# Patient Record
Sex: FEMALE | Race: WHITE | Employment: OTHER | URBAN - METROPOLITAN AREA
[De-identification: names, ages, dates, MRNs, and addresses within clinical notes are randomized per-mention and may not be internally consistent; named-entity substitution may affect disease eponyms.]

---

## 2021-03-24 ENCOUNTER — APPOINTMENT (OUTPATIENT)
Dept: GENERAL RADIOLOGY | Age: 71
End: 2021-03-24
Payer: MEDICARE

## 2021-03-24 ENCOUNTER — HOSPITAL ENCOUNTER (EMERGENCY)
Age: 71
Discharge: HOME | End: 2021-03-24
Attending: EMERGENCY MEDICINE
Payer: MEDICARE

## 2021-03-24 VITALS
BODY MASS INDEX: 18.16 KG/M2 | TEMPERATURE: 97.5 F | DIASTOLIC BLOOD PRESSURE: 66 MMHG | HEIGHT: 66 IN | SYSTOLIC BLOOD PRESSURE: 131 MMHG | WEIGHT: 113 LBS | OXYGEN SATURATION: 98 % | HEART RATE: 79 BPM | RESPIRATION RATE: 18 BRPM

## 2021-03-24 DIAGNOSIS — S52.531A CLOSED COLLES' FRACTURE OF RIGHT RADIUS, INITIAL ENCOUNTER: Primary | ICD-10-CM

## 2021-03-24 PROCEDURE — 73110 X-RAY EXAM OF WRIST: CPT

## 2021-03-24 PROCEDURE — 99285 EMERGENCY DEPT VISIT HI MDM: CPT

## 2021-03-24 PROCEDURE — 6370000000 HC RX 637 (ALT 250 FOR IP): Performed by: PHYSICIAN ASSISTANT

## 2021-03-24 PROCEDURE — 25605 CLTX DST RDL FX/EPHYS SEP W/: CPT

## 2021-03-24 PROCEDURE — 73130 X-RAY EXAM OF HAND: CPT

## 2021-03-24 RX ORDER — CALCIUM CARBONATE 300MG(750)
TABLET,CHEWABLE ORAL
COMMUNITY
End: 2021-03-26 | Stop reason: ALTCHOICE

## 2021-03-24 RX ORDER — CYANOCOBALAMIN (VITAMIN B-12) 1000 MCG
2 TABLET, EXTENDED RELEASE ORAL 2 TIMES DAILY WITH MEALS
COMMUNITY

## 2021-03-24 RX ORDER — OMEPRAZOLE 20 MG/1
20 CAPSULE, DELAYED RELEASE ORAL 2 TIMES DAILY
COMMUNITY

## 2021-03-24 RX ORDER — POTASSIUM CHLORIDE 750 MG/1
10 CAPSULE, EXTENDED RELEASE ORAL 2 TIMES DAILY
COMMUNITY

## 2021-03-24 RX ORDER — LEVOCETIRIZINE DIHYDROCHLORIDE 5 MG/1
5 TABLET, FILM COATED ORAL NIGHTLY
COMMUNITY

## 2021-03-24 RX ORDER — TRAMADOL HYDROCHLORIDE 50 MG/1
50-100 TABLET ORAL EVERY 6 HOURS PRN
Qty: 10 TABLET | Refills: 0 | Status: SHIPPED | OUTPATIENT
Start: 2021-03-24 | End: 2021-03-27

## 2021-03-24 RX ORDER — ACETAMINOPHEN 325 MG/1
650 TABLET ORAL ONCE
Status: COMPLETED | OUTPATIENT
Start: 2021-03-24 | End: 2021-03-24

## 2021-03-24 RX ORDER — FERROUS SULFATE 325(65) MG
325 TABLET ORAL 2 TIMES DAILY
COMMUNITY

## 2021-03-24 RX ORDER — TRAMADOL HYDROCHLORIDE 50 MG/1
50-100 TABLET ORAL EVERY 6 HOURS PRN
Qty: 10 TABLET | Refills: 0 | Status: SHIPPED | OUTPATIENT
Start: 2021-03-24 | End: 2021-03-24 | Stop reason: SDUPTHER

## 2021-03-24 RX ADMIN — ACETAMINOPHEN 650 MG: 325 TABLET ORAL at 17:07

## 2021-03-24 ASSESSMENT — PAIN DESCRIPTION - ORIENTATION: ORIENTATION: RIGHT

## 2021-03-24 NOTE — ED NOTES
Patient cleared for discharge. Patient discharge instructions explained, Rx given and explained to patient. Patient verbalized understanding of all instructions and all patient questions answered to their satisfaction. Patient departs in stable condition.         Arleen Carballo RN  03/24/21 [id number]

## 2021-03-24 NOTE — ED NOTES
JOSE Romp is at bedside reducing pt's right wrist and applying splint. Pt is tolerating well.       Nadine Navarro RN  03/24/21 [id number]

## 2021-03-24 NOTE — ED NOTES
Pt arrives with c/o right wrist injury that occurred PTA. Pt reports she was standing up from a chair, lost her balance, and fell backwards. Pt reports she landed on her buttock and braced herself with right arm. Pt denies hitting head or LOC. Pt has obvious deformity to right wrist and ROM is decreased d/t pain. Pulses and sensation are intact to right hand. Pt denies any other injuries.  Pt given ice pack for wrist.     Briseyda Duarte RN  03/24/21 1943

## 2021-03-24 NOTE — ED PROVIDER NOTES
[address] [facility] ED    [address] [facility] [address]  Phone: [phone]  Fax: [phone]  Emergency Department  Faculty Attestation    I performed a history and physical examination of the patient and discussed management with the mid level provideer. I reviewed the mid level provider's note and agree with the documented findings and plan of care. Any areas of disagreement are noted on the chart. I was personally present for the key portions of any procedures. I have documented in the chart those procedures where I was not present during the key portions. I have reviewed the emergency nurses triage note. I agree with the chief complaint, past medical history, past surgical history, allergies, medications, social and family history as documented unless otherwise noted below. Documentation of the HPI, Physical Exam and Medical Decision Making performed by medical students or scribes is based on my personal performance of the HPI, PE and MDM. For Physician Assistant/ Nurse Practitioner cases/documentation I have personally evaluated this patient and have completed at least one if not all key elements of the E/M (history, physical exam, and MDM). Additional findings are as noted. Primary Care Physician:  Kristin Gong       Chief Complaint   Patient presents with    Wrist Injury       RECENT VITALS:   Temp: 97.5 °F (36.4 °C),  Pulse: 79, Resp: 18, BP: 131/66    LABS:  Labs Reviewed - No data to display     XR WRIST RIGHT (MIN 3 VIEWS) (Final result)  Result time 03/24/21 18:03:31  Final result by Mikey Conner DO (03/24/21 18:03:31)                Impression:    Improved alignment of the distal radius fracture status post reduction and   splinting. Narrative:    EXAMINATION:   3 XRAY VIEWS OF THE RIGHT WRIST     3/24/2021 2:55 pm     COMPARISON:   None.      HISTORY:   ORDERING SYSTEM PROVIDED HISTORY: post-reduction   TECHNOLOGIST PROVIDED HISTORY: OF THE RIGHT WRIST; THREE XRAY VIEWS OF THE RIGHT HAND     3/24/2021 1:57 pm     COMPARISON:   None. HISTORY:   ORDERING SYSTEM PROVIDED HISTORY: pain, [address]   TECHNOLOGIST PROVIDED HISTORY:   pain, [address]   Reason for Exam: Pt states fall, [address] RT wrist pain and deformity   Acuity: Acute   Type of Exam: Initial; ORDERING SYSTEM PROVIDED HISTORY: fall, [address]   TECHNOLOGIST PROVIDED HISTORY:   fall, [address]   Reason for Exam: Pt states fall, [address] RT wrist pain and deformity   Acuity: Acute   Type of Exam: Initial     FINDINGS:   Comminuted and impacted distal radius fracture with approximately 45 degrees   distal dorsal angulation.  Nondisplaced ulnar styloid fracture.  Moderate   soft tissue swelling about the wrist.  Degenerative changes at the   interphalangeal joints and radial sided wrist on a background of osseous   demineralization.                       PERTINENT ATTENDING PHYSICIAN COMMENTS:    The patient presents for a fall onto her buttocks and  outstretched right wrist.  The fall occurred prior to arrival.  She did not strike her head or lose consciousness. She was not dizzy prior to or following the fall. On exam, the patient has obvious deformity to the wrist with an intact radial pulse and no open wounds. She has intact sensorimotor functioning of the fingers and no pain in the elbow or shoulder. The Xray confirms fracture. The patient was splinted by the PA. The patient had good color, sensation, motion of the fingertips after placement. We will refer her to the orthopedist and provide medication for pain. The patient is discharged in good condition.          Nita Reich MD  03/24/21 6693

## 2021-03-24 NOTE — ED PROVIDER NOTES
[address] [facility] ED  [address] [facility] [address]  Phone: [phone]  Fax: [phone]      eMERGENCY dEPARTMENT eNCOUnter      Pt Name: Arnie Zayas  MRN: [id number]  [place] 1950  Date of evaluation: 3/24/21      CHIEF COMPLAINT:  Chief Complaint   Patient presents with    Wrist Injury       [address] [facility] Geena Perry is a 66 y.o. female who presents with evaluation for orthopedic pain:    Location/Symptom:   Right wrist pain/deformity  Timing/Onset:   JPTA  Context/Setting:    Pt was standing up from seated position and fell backwards. She landed on buttocks and caught herself with her right hand. Pain/swelling/deformity of wrist since that time. Did not strike head. No HA/neck-back/chest-abd-other extremity pain or injury reported. No paresthesias/focal weakness. Quality:   Achy, sharp  Duration:   constant  Modifying Factors: Worse with movement and weightbearing, better with rest  Severity:   Mild-moderate    Nursing Notes were reviewed. REVIEW OF SYSTEMS       Constitutional: Denies recent fever, chills. Eyes: No vision changes. Neck: No neck pain. Respiratory: Denies recent shortness of breath. Cardiac:  Denies recent chest pain. GI:  Denies abdominal pain/nausea/vomiting/diarrhea. : Denies dysuria. Musculoskeletal:   Per HPI  Neurologic:  No headache. No focal weakness. No paresthesias. Skin:  Denies any rash. Negative in 10 essential Systems except as mentioned above and in the HPI. PAST MEDICAL HISTORY   PMH:  has a past medical history of Bronchiectasis ([facility] [address] Diabetes mellitus ([facility] [address] and Osteoporosis. Surgical History:  has no past surgical history on file. Social History:  reports that she has never smoked. She has never used smokeless tobacco. She reports current alcohol use. She reports that she does not use drugs.   Family History: None  Psychiatric History: None    Allergies:is allergic to penicillins and other.      PHYSICAL EXAM     INITIAL VITALS: /66   Pulse 79   Temp 97.5 °F (36.4 °C) (Oral)   Resp 18   Ht 5' 6\" (1.676 m)   Wt 51.3 kg (113 lb)   SpO2 98%   BMI 18.24 kg/m²     Constitutional:  Well developed   Eyes:  Pupils equal/round  HENT:  Atraumatic, external ears normal, nose normal  Respiratory:  LCTA bilat, no W/R/R  Cardiovascular:  RRR with normal S1 and S2  Musculoskeletal:  Dorsal deformity of right wrist with overlying edema, no bony tenting. Limited ROM due to pain. Moving fingers actively, NV intact distally x 5. No other cervical spine/GH-elbow TTP or pain with ROM. No pain with pelvic compression. No TTP of BLE, normal ROM. NV intact distally. Back:  No midline or CVA tenderness. Integument:   No rash. Neurologic:  Alert & appropriate mentation/interaction, no focal deficits noted     DIAGNOSTIC RESULTS     EKG: All EKG's are interpreted by the Emergency Department Physician who either signs or Co-signs this chart in the absence of a cardiologist.  Not indicated    RADIOLOGY:   Reviewed the radiologist:  XR WRIST RIGHT (MIN 3 VIEWS)   Final Result   Improved alignment of the distal radius fracture status post reduction and   splinting. XR HAND RIGHT (MIN 3 VIEWS)   Final Result   Comminuted and impacted distal radius fracture with nearly 45 degrees of   distal dorsal angulation. Nondisplaced ulnar styloid fracture. XR WRIST RIGHT (MIN 3 VIEWS)   Final Result   Comminuted and impacted distal radius fracture with nearly 45 degrees of   distal dorsal angulation. Nondisplaced ulnar styloid fracture. LABS:  Labs Reviewed - No data to display  Not indicated    EMERGENCY DEPARTMENT COURSE / MDM:     [address] injury, no head injury, no blood thinners. Landed on buttocks and then right wrist/hand. Tylenol and XRs ordered.   No other trauma by history or exam.       Joint Reduction Procedure Note    Indication: fracture    Consent: The patient provided verbal consent for this procedure. Procedure: The pre-reduction exam showed distal perfusion & neurologic function to be normal. The patient was placed in the sitting position. Anesthesia/pain control was obtained using a hematoma block of the affected area using 2.0 cc of 1% Lidocaine without epinephrine. Reduction of the right wrist was performed by traction and counter traction. Post reduction films were obtained and revealed satisfactory reduction. A post-reduction exam revealed distal perfusion & neurologic function to be normal. The affected area was immobilized with a thumb spica splint/wrist splint. The patient tolerated the procedure well. Complications: None    Splinting Procedure:    4\" Orthoglass used for  THUMBSPICA  splint, good immobilization of injured joint. My splint re-evaluation demonstrates that pt is NV intact distally after curing and is having no reported discomfort. Pt referred to Orthopedics for follow up/fracture management. No [facility] appt available until Monday which doesn't work for her scheduled. Giving [facility] referral, having her call their office tomorrow morning for scheduling. Sling applied. Improved alignment with reduction. [id number]  I have reviewed the disposition diagnosis with the patient and or their family/guardian. I have answered their questions and given discharge instructions. They voiced understanding of these instructions and did not have any further questions or complaints. Discussed checking cap refill for good blood flow. Controlled Substance Monitoring:    Acute and Chronic Pain Monitoring:   RX Monitoring 3/24/2021   Periodic Controlled Substance Monitoring No signs of potential drug abuse or diversion identified.          Orders Placed This Encounter   Medications    acetaminophen (TYLENOL) tablet 650 mg    traMADol (ULTRAM) 50 MG tablet     Sig: Take 1-2 tablets by mouth every 6 hours as needed for Pain for up to 3 days. Dispense:  10 tablet     Refill:  0       CONSULTS:  None      FINAL IMPRESSION      1. Closed Colles' fracture of right radius, initial encounter          DISPOSITION/PLAN:  DISPOSITION          PATIENT REFERRED TO:  Eneida Parra DO  [address] [place] [address]  [phone]    Schedule an appointment as soon as possible for a visit   For fracture management    [facility] ED  [address] [place] [id number] [phone]  Go to   increasing pain, numbness or significant swelling of hand/fingers      DISCHARGE MEDICATIONS:  New Prescriptions    TRAMADOL (ULTRAM) 50 MG TABLET    Take 1-2 tablets by mouth every 6 hours as needed for Pain for up to 3 days.        (Please note that portions of this note were completed with a voice recognition program.  Efforts were made to edit the dictations but occasionally words are mis-transcribed.)    JOSE Ken PA-C  03/24/21 [id number]

## 2021-03-26 ENCOUNTER — HOSPITAL ENCOUNTER (OUTPATIENT)
Age: 71
Discharge: HOME | End: 2021-03-26
Payer: MEDICARE

## 2021-03-26 ENCOUNTER — HOSPITAL ENCOUNTER (OUTPATIENT)
Dept: LAB | Age: 71
Discharge: HOME | End: 2021-03-26
Payer: MEDICARE

## 2021-03-26 ENCOUNTER — HOSPITAL ENCOUNTER (OUTPATIENT)
Dept: GENERAL RADIOLOGY | Age: 71
Discharge: HOME | End: 2021-03-28
Payer: MEDICARE

## 2021-03-26 ENCOUNTER — HOSPITAL ENCOUNTER (OUTPATIENT)
Age: 71
Discharge: HOME | End: 2021-03-28
Payer: MEDICARE

## 2021-03-26 ENCOUNTER — OFFICE VISIT (OUTPATIENT)
Dept: ORTHOPEDIC SURGERY | Age: 71
End: 2021-03-26
Payer: MEDICARE

## 2021-03-26 VITALS — HEIGHT: 66 IN | BODY MASS INDEX: 18.16 KG/M2 | WEIGHT: 113 LBS

## 2021-03-26 DIAGNOSIS — Z01.818 PRE-OP TESTING: Primary | ICD-10-CM

## 2021-03-26 DIAGNOSIS — Z01.818 PRE-OP TESTING: ICD-10-CM

## 2021-03-26 DIAGNOSIS — S52.501A CLOSED FRACTURE OF DISTAL END OF RIGHT RADIUS, UNSPECIFIED FRACTURE MORPHOLOGY, INITIAL ENCOUNTER: Primary | ICD-10-CM

## 2021-03-26 DIAGNOSIS — Z20.822 COVID-19 RULED OUT BY LABORATORY TESTING: ICD-10-CM

## 2021-03-26 DIAGNOSIS — Z20.822 COVID-19 RULED OUT BY LABORATORY TESTING: Primary | ICD-10-CM

## 2021-03-26 LAB
-: NORMAL
ALBUMIN SERPL-MCNC: 3.8 G/DL (ref 3.5–5.2)
ALBUMIN/GLOBULIN RATIO: 1 (ref 1–2.5)
ALP BLD-CCNC: 286 U/L (ref 35–104)
ALT SERPL-CCNC: 58 U/L (ref 5–33)
AMORPHOUS: NORMAL
ANION GAP SERPL CALCULATED.3IONS-SCNC: 11 MMOL/L (ref 9–17)
AST SERPL-CCNC: 34 U/L
BACTERIA: NORMAL
BILIRUB SERPL-MCNC: 0.31 MG/DL (ref 0.3–1.2)
BILIRUBIN URINE: ABNORMAL
BUN BLDV-MCNC: 11 MG/DL (ref 8–23)
BUN/CREAT BLD: ABNORMAL (ref 9–20)
CALCIUM SERPL-MCNC: 9 MG/DL (ref 8.6–10.4)
CASTS UA: NORMAL /LPF (ref 0–8)
CHLORIDE BLD-SCNC: 99 MMOL/L (ref 98–107)
CO2: 28 MMOL/L (ref 20–31)
COLOR: ABNORMAL
COMMENT UA: ABNORMAL
CREAT SERPL-MCNC: 0.46 MG/DL (ref 0.5–0.9)
CRYSTALS, UA: NORMAL /HPF
EPITHELIAL CELLS UA: NORMAL /HPF (ref 0–5)
GFR AFRICAN AMERICAN: >60 ML/MIN
GFR NON-AFRICAN AMERICAN: >60 ML/MIN
GFR SERPL CREATININE-BSD FRML MDRD: ABNORMAL ML/MIN/{1.73_M2}
GFR SERPL CREATININE-BSD FRML MDRD: ABNORMAL ML/MIN/{1.73_M2}
GLUCOSE BLD-MCNC: 100 MG/DL (ref 70–99)
GLUCOSE URINE: NEGATIVE
HCT VFR BLD CALC: 40.6 % (ref 36.3–47.1)
HEMOGLOBIN: 12.6 G/DL (ref 11.9–15.1)
KETONES, URINE: ABNORMAL
LEUKOCYTE ESTERASE, URINE: ABNORMAL
MCH RBC QN AUTO: 29.7 PG (ref 25.2–33.5)
MCHC RBC AUTO-ENTMCNC: 31 G/DL (ref 28.4–34.8)
MCV RBC AUTO: 95.8 FL (ref 82.6–102.9)
MUCUS: NORMAL
NITRITE, URINE: NEGATIVE
NRBC AUTOMATED: 0 PER 100 WBC
OTHER OBSERVATIONS UA: NORMAL
PDW BLD-RTO: 12.7 % (ref 11.8–14.4)
PH UA: 5 (ref 5–8)
PLATELET # BLD: 224 K/UL (ref 138–453)
PMV BLD AUTO: 12.2 FL (ref 8.1–13.5)
POTASSIUM SERPL-SCNC: 3.2 MMOL/L (ref 3.7–5.3)
PROTEIN UA: ABNORMAL
RBC # BLD: 4.24 M/UL (ref 3.95–5.11)
RBC UA: NORMAL /HPF (ref 0–4)
RENAL EPITHELIAL, UA: NORMAL /HPF
SODIUM BLD-SCNC: 138 MMOL/L (ref 135–144)
SPECIFIC GRAVITY UA: 1.03 (ref 1–1.03)
TOTAL PROTEIN: 7.5 G/DL (ref 6.4–8.3)
TRICHOMONAS: NORMAL
TURBIDITY: CLEAR
URINE HGB: NEGATIVE
UROBILINOGEN, URINE: NORMAL
WBC # BLD: 11.8 K/UL (ref 3.5–11.3)
WBC UA: NORMAL /HPF (ref 0–5)
YEAST: NORMAL

## 2021-03-26 PROCEDURE — 85027 COMPLETE CBC AUTOMATED: CPT

## 2021-03-26 PROCEDURE — 4040F PNEUMOC VAC/ADMIN/RCVD: CPT | Performed by: ORTHOPAEDIC SURGERY

## 2021-03-26 PROCEDURE — G8400 PT W/DXA NO RESULTS DOC: HCPCS | Performed by: ORTHOPAEDIC SURGERY

## 2021-03-26 PROCEDURE — 1123F ACP DISCUSS/DSCN MKR DOCD: CPT | Performed by: ORTHOPAEDIC SURGERY

## 2021-03-26 PROCEDURE — 93005 ELECTROCARDIOGRAM TRACING: CPT | Performed by: ORTHOPAEDIC SURGERY

## 2021-03-26 PROCEDURE — 81001 URINALYSIS AUTO W/SCOPE: CPT

## 2021-03-26 PROCEDURE — 1090F PRES/ABSN URINE INCON ASSESS: CPT | Performed by: ORTHOPAEDIC SURGERY

## 2021-03-26 PROCEDURE — 3017F COLORECTAL CA SCREEN DOC REV: CPT | Performed by: ORTHOPAEDIC SURGERY

## 2021-03-26 PROCEDURE — U0005 INFEC AGEN DETEC AMPLI PROBE: HCPCS

## 2021-03-26 PROCEDURE — G8427 DOCREV CUR MEDS BY ELIG CLIN: HCPCS | Performed by: ORTHOPAEDIC SURGERY

## 2021-03-26 PROCEDURE — G8419 CALC BMI OUT NRM PARAM NOF/U: HCPCS | Performed by: ORTHOPAEDIC SURGERY

## 2021-03-26 PROCEDURE — U0003 INFECTIOUS AGENT DETECTION BY NUCLEIC ACID (DNA OR RNA); SEVERE ACUTE RESPIRATORY SYNDROME CORONAVIRUS 2 (SARS-COV-2) (CORONAVIRUS DISEASE [COVID-19]), AMPLIFIED PROBE TECHNIQUE, MAKING USE OF HIGH THROUGHPUT TECHNOLOGIES AS DESCRIBED BY CMS-2020-01-R: HCPCS

## 2021-03-26 PROCEDURE — 99204 OFFICE O/P NEW MOD 45 MIN: CPT | Performed by: ORTHOPAEDIC SURGERY

## 2021-03-26 PROCEDURE — 71046 X-RAY EXAM CHEST 2 VIEWS: CPT

## 2021-03-26 PROCEDURE — 36415 COLL VENOUS BLD VENIPUNCTURE: CPT

## 2021-03-26 PROCEDURE — 80053 COMPREHEN METABOLIC PANEL: CPT

## 2021-03-26 PROCEDURE — G8484 FLU IMMUNIZE NO ADMIN: HCPCS | Performed by: ORTHOPAEDIC SURGERY

## 2021-03-26 PROCEDURE — 1036F TOBACCO NON-USER: CPT | Performed by: ORTHOPAEDIC SURGERY

## 2021-03-26 RX ORDER — ALBUTEROL SULFATE 90 UG/1
2 AEROSOL, METERED RESPIRATORY (INHALATION) EVERY 6 HOURS PRN
COMMUNITY

## 2021-03-26 RX ORDER — SODIUM CHLORIDE FOR INHALATION 0.9 %
3 VIAL, NEBULIZER (ML) INHALATION DAILY
COMMUNITY
Start: 2021-03-08

## 2021-03-26 RX ORDER — MULTIVITAMIN WITH IRON
1800 TABLET ORAL NIGHTLY
COMMUNITY

## 2021-03-26 RX ORDER — DIPHENHYDRAMINE HCL 25 MG
25 CAPSULE ORAL DAILY
COMMUNITY

## 2021-03-26 RX ORDER — ZOLEDRONIC ACID 5 MG/100ML
1 INJECTION, SOLUTION INTRAVENOUS PRN
COMMUNITY

## 2021-03-26 RX ORDER — ERGOCALCIFEROL 1.25 MG/1
50000 CAPSULE ORAL DAILY
COMMUNITY

## 2021-03-26 ASSESSMENT — ENCOUNTER SYMPTOMS
APNEA: 0
COUGH: 0
ABDOMINAL PAIN: 0
VOMITING: 0
CHEST TIGHTNESS: 0

## 2021-03-26 NOTE — PROGRESS NOTES
[facility]  [facility]  [address]  [facility] [address]  Dept: [phone]    Ambulatory Orthopedic New Patient Visit      CHIEF COMPLAINT:    Chief Complaint   Patient presents with    Wrist Pain     right wrist DOI:3/24/21       HISTORY OF PRESENT ILLNESS:      The patient is a 66 y.o. female who is being seen  for consultation and evaluation of right wrist injury. Bonny Beyer  is a Right dominant female with a  2 day(s) history of right wrist pain. Patient had a fall to the right wrist with immediate pain and deformity to the right wrist. The patient denies numbness and tingling to the fingers. The patient's normal sleep patterns are affected. The patient has not had a previous corticosteroid injection. The patient has not had previous physical therapy for this problem. The patient has tried oral NSAIDs for this problem previously. After the patient fell she went to the ED. The patient was reduced in the ED on 3/24/21 and paced into a thumb spica splint. The patient maintaining the splint well. Patient mentions that she has broken the right wrist in the past. The patient was treated in a cast. Patient states that she is Immune deficient and currently on medication. Patient has osteoporosis and has had treatment for that as well. Last treatment in August of 2020.               Past Medical History:    Past Medical History:   Diagnosis Date    Arthritis     Bronchiectasis ([address]     Bronchiectasis ([address]     on saline aerosol daily, not currentlt following with pulmonology    Claustrophobia     Diabetes mellitus ([address]     managed per Dr. Annmarie Cordon 03/24/2021    fractured right wrist    Immune deficiency disorder ([address]     gets infusions every 2 weeks, follows with Dr. Heather Dunne Osteoporosis     Snores     Stomach problems     follows with GI Flavia Mad CNP, \" G[facility] \" , GERD, hiatel hernia, ? fatty liver     Wellness examination      Allergies:    Penicillins, Seasonal, and Other    Social History:   Social History     Socioeconomic History    Marital status:      Spouse name: Not on file    Number of children: Not on file    Years of education: Not on file    Highest education level: Not on file   Occupational History    Not on file   Social Needs    Financial resource strain: Not on file    Food insecurity     Worry: Not on file     Inability: Not on file    Transportation needs     Medical: Not on file     Non-medical: Not on file   Tobacco Use    Smoking status: Never Smoker    Smokeless tobacco: Never Used   Substance and Sexual Activity    Alcohol use: Yes     Alcohol/week: 2.0 standard drinks     Types: 2 Glasses of wine per week     Comment: 1 time per week    Drug use: Never    Sexual activity: Not on file   Lifestyle    Physical activity     Days per week: Not on file     Minutes per session: Not on file    Stress: Not on file   Relationships    Social connections     Talks on phone: Not on file     Gets together: Not on file     Attends [employer] service: Not on file     Active member of club or organization: Not on file     Attends meetings of clubs or organizations: Not on file     Relationship status: Not on file    Intimate partner violence     Fear of current or ex partner: Not on file     Emotionally abused: Not on file     Physically abused: Not on file     Forced sexual activity: Not on file   Other Topics Concern    Not on file   Social History Narrative    Not on file       Family History:  No family history on file. REVIEW OF SYSTEMS:  Review of Systems   Constitutional: Negative for chills and fever. Respiratory: Negative for apnea, cough and chest tightness. Cardiovascular: Negative for chest pain and palpitations. Gastrointestinal: Negative for abdominal pain and vomiting. Genitourinary: Negative for difficulty urinating. Musculoskeletal: Positive for arthralgias (right wrist). Negative for gait problem, joint swelling and myalgias. Neurological: Negative for dizziness, weakness and numbness. I have reviewed the CC, HPI, ROS, PMH, FHX, Social History. I agree with the documentation provided by other staff, residents and havereviewed their documentation prior to providing my signature indicating agreement. PHYSICAL EXAM:  Ht 5' 6\" (1.676 m)   Wt 113 lb (51.3 kg)   BMI 18.24 kg/m²  Body mass index is 18.24 kg/m². Physical Exam  Gen: alert and oriented  Psych:  Appropriate affect; Appropriate knowledge base; Appropriate mood; No hallucinations; Head: normocephalic atraumatic   Chest: symmetric chest excursion  Pelvis: stable  Ortho Exam  Extremity: Right wrist is in a sugar tong splint. Sugar tong splint is fitting well with no significant impingement. Fingers are slightly swollen on the right but pink, warm, with brisk capillary refill. No tenderness of the right elbow is noted. Motor, sensory, vascular examination of the right upper extremity is grossly intact without focal deficits. Patient has full range of motion of the right shoulder. Radiology:     Xr Chest (2 Vw)    Result Date: 3/26/2021  EXAMINATION: TWO XRAY VIEWS OF THE CHEST 3/26/2021 9:12 am COMPARISON: None. HISTORY: ORDERING SYSTEM PROVIDED HISTORY: Pre-op testing Reason for Exam: no chest complaints FINDINGS: The lungs are without acute focal process. No effusion or pneumothorax. The cardiomediastinal silhouette is normal.  The osseous structures are intact without acute process. No acute process. Xr Wrist Right (min 3 Views)    Result Date: 3/24/2021  EXAMINATION: 3 XRAY VIEWS OF THE RIGHT WRIST 3/24/2021 2:55 pm COMPARISON: None.  HISTORY: ORDERING SYSTEM PROVIDED HISTORY: post-reduction TECHNOLOGIST PROVIDED HISTORY: post-reduction Reason for Exam: post reduction Acuity: Unknown Type of Exam: Unknown FINDINGS: Improved distal dorsal angulation status post reduction of the comminuted distal Nondisplaced ulnar styloid fracture. Moderate soft tissue swelling about the wrist.  Degenerative changes at the interphalangeal joints and radial sided wrist on a background of osseous demineralization. Comminuted and impacted distal radius fracture with nearly 45 degrees of distal dorsal angulation. Nondisplaced ulnar styloid fracture. ASSESSMENT:     1. Closed fracture of distal end of right radius, unspecified fracture morphology, initial encounter         PLAN:       Discussed etiology and natural history of right distal radius fracture. The treatment options may include oral anti-inflammatories, bracing, injections, advanced imaging, activity modification, physical therapy and/or surgical intervention. The patient has a comminuted intra-articular distal radius fracture with resultant persistent dorsal angulation and displacement. It is felt that the patient should consider open reduction and internal fixation or possible reduction and percutaneous pin fixation to restore the wrist anatomy to a more anatomic position and ultimately to help with the patient's outcome. The patient would like to proceed with ORIF Vs CRPP of the right wrist. I would like to have the patient work on edema control of the right wrist and hand. The patient will follow up in the office 2 weeks after surgery. We discussed that the patient should call us with any concerns or questions. All details of the procedure, as well as risks, benefits and alternatives, including the option of non operative versus operative treatment were discussed.   The patient understands that risks of the surgery include but are not limited to: bleeding, malunion/nonunion, loss of fixation, loss of reduction, hardware failure, angular or rotational deformity, length discrepancy, limp, transfusion, skin blistering or breakdown, progressive post traumatic degenerative joint disease, possible need for further surgery, bone grafting, infection, nerve injury, paralysis, numbness, blood vessel injury, excessive scaring, wound complication or breakdown, failure of symptoms to improve or actual deterioration in condition, significant acute and/or chronic pain, possible need for amputation, permanent loss of motion, and permanent loss of function. As well as the general complications of anesthesia, which include but are not limited to: myocardial infarction and/or heart attack, stroke, multi organ system failure or even possible death, prolonged hospital stay, blood clots, pulmonary embolism, abnormal reaction to medication, visual and neurological disturbances, constipation, ischemic bowel, bowel obstruction, bowel perforation, ileus and mental status changes. No guarantees were made. Return for Two weeks postoperatively. No orders of the defined types were placed in this encounter. No orders of the defined types were placed in this encounter. Riri Pete LPN am scribing for and in the presence of Dr. Domenic Molina  3/28/2021 8:08 PM    I have reviewed and made changes accordingly to the work scribed by Logan Angel LPN. The documentation accurately reflects work and decisions made by me. I have also reviewed documentation completed by clinical staff.     Domenic Molina DO, [facility]  3/28/2021 8:09 PM    This note is created with the assistance of a speech recognition program.  While intending to generate a document that actually reflects the content of the visit, the document can still have some errors including those of syntax and sound a like substitutions which may escape proof reading.  In such instances, actual meaning can be extrapolated by contextual diversion      Electronically signed by Warden Jessica DO, FAOAO on 3/28/2021 at 8:08 PM

## 2021-03-27 LAB
EKG ATRIAL RATE: 86 BPM
EKG P AXIS: 88 DEGREES
EKG P-R INTERVAL: 152 MS
EKG Q-T INTERVAL: 368 MS
EKG QRS DURATION: 80 MS
EKG QTC CALCULATION (BAZETT): 440 MS
EKG R AXIS: 76 DEGREES
EKG T AXIS: 79 DEGREES
EKG VENTRICULAR RATE: 86 BPM

## 2021-03-27 PROCEDURE — 93010 ELECTROCARDIOGRAM REPORT: CPT | Performed by: INTERNAL MEDICINE

## 2021-03-28 LAB
SARS-COV-2: NORMAL
SARS-COV-2: NOT DETECTED
SOURCE: NORMAL

## 2021-03-29 ENCOUNTER — TELEPHONE (OUTPATIENT)
Dept: PRIMARY CARE CLINIC | Age: 71
End: 2021-03-29

## 2021-03-30 ENCOUNTER — ANESTHESIA (OUTPATIENT)
Dept: OPERATING ROOM | Age: 71
End: 2021-03-30
Payer: MEDICARE

## 2021-03-30 ENCOUNTER — HOSPITAL ENCOUNTER (OUTPATIENT)
Age: 71
Setting detail: OUTPATIENT SURGERY
Discharge: HOME | End: 2021-03-30
Attending: ORTHOPAEDIC SURGERY | Admitting: ORTHOPAEDIC SURGERY
Payer: MEDICARE

## 2021-03-30 ENCOUNTER — APPOINTMENT (OUTPATIENT)
Dept: GENERAL RADIOLOGY | Age: 71
End: 2021-03-30
Attending: ORTHOPAEDIC SURGERY
Payer: MEDICARE

## 2021-03-30 ENCOUNTER — ANESTHESIA EVENT (OUTPATIENT)
Dept: OPERATING ROOM | Age: 71
End: 2021-03-30
Payer: MEDICARE

## 2021-03-30 VITALS
WEIGHT: 108 LBS | SYSTOLIC BLOOD PRESSURE: 122 MMHG | DIASTOLIC BLOOD PRESSURE: 60 MMHG | RESPIRATION RATE: 16 BRPM | OXYGEN SATURATION: 95 % | HEIGHT: 66 IN | TEMPERATURE: 97.5 F | BODY MASS INDEX: 17.36 KG/M2 | HEART RATE: 75 BPM

## 2021-03-30 VITALS — SYSTOLIC BLOOD PRESSURE: 108 MMHG | TEMPERATURE: 97.3 F | DIASTOLIC BLOOD PRESSURE: 55 MMHG | OXYGEN SATURATION: 99 %

## 2021-03-30 DIAGNOSIS — S52.531A CLOSED COLLES' FRACTURE OF RIGHT RADIUS, INITIAL ENCOUNTER: Primary | ICD-10-CM

## 2021-03-30 LAB
GFR NON-AFRICAN AMERICAN: >60 ML/MIN
GFR SERPL CREATININE-BSD FRML MDRD: >60 ML/MIN
GFR SERPL CREATININE-BSD FRML MDRD: NORMAL ML/MIN/{1.73_M2}
GLUCOSE BLD-MCNC: 85 MG/DL (ref 65–105)
GLUCOSE BLD-MCNC: 98 MG/DL (ref 74–100)
POC CREATININE: 0.56 MG/DL (ref 0.51–1.19)
POC POTASSIUM: 4.2 MMOL/L (ref 3.5–4.5)

## 2021-03-30 PROCEDURE — 82947 ASSAY GLUCOSE BLOOD QUANT: CPT

## 2021-03-30 PROCEDURE — 2500000003 HC RX 250 WO HCPCS: Performed by: ANESTHESIOLOGY

## 2021-03-30 PROCEDURE — 2580000003 HC RX 258: Performed by: ANESTHESIOLOGY

## 2021-03-30 PROCEDURE — 2720000010 HC SURG SUPPLY STERILE: Performed by: ORTHOPAEDIC SURGERY

## 2021-03-30 PROCEDURE — 3700000001 HC ADD 15 MINUTES (ANESTHESIA): Performed by: ORTHOPAEDIC SURGERY

## 2021-03-30 PROCEDURE — 3700000000 HC ANESTHESIA ATTENDED CARE: Performed by: ORTHOPAEDIC SURGERY

## 2021-03-30 PROCEDURE — C1713 ANCHOR/SCREW BN/BN,TIS/BN: HCPCS | Performed by: ORTHOPAEDIC SURGERY

## 2021-03-30 PROCEDURE — 6360000002 HC RX W HCPCS: Performed by: NURSE ANESTHETIST, CERTIFIED REGISTERED

## 2021-03-30 PROCEDURE — 2580000003 HC RX 258: Performed by: ORTHOPAEDIC SURGERY

## 2021-03-30 PROCEDURE — 3209999900 FLUORO FOR SURGICAL PROCEDURES

## 2021-03-30 PROCEDURE — 64415 NJX AA&/STRD BRCH PLXS IMG: CPT | Performed by: ANESTHESIOLOGY

## 2021-03-30 PROCEDURE — 3600000005 HC SURGERY LEVEL 5 BASE: Performed by: ORTHOPAEDIC SURGERY

## 2021-03-30 PROCEDURE — 7100000000 HC PACU RECOVERY - FIRST 15 MIN: Performed by: ORTHOPAEDIC SURGERY

## 2021-03-30 PROCEDURE — 2709999900 HC NON-CHARGEABLE SUPPLY: Performed by: ORTHOPAEDIC SURGERY

## 2021-03-30 PROCEDURE — 7100000011 HC PHASE II RECOVERY - ADDTL 15 MIN: Performed by: ORTHOPAEDIC SURGERY

## 2021-03-30 PROCEDURE — 6360000002 HC RX W HCPCS: Performed by: STUDENT IN AN ORGANIZED HEALTH CARE EDUCATION/TRAINING PROGRAM

## 2021-03-30 PROCEDURE — 7100000001 HC PACU RECOVERY - ADDTL 15 MIN: Performed by: ORTHOPAEDIC SURGERY

## 2021-03-30 PROCEDURE — 25608 OPTX DST RD XART FX/EPI SEP2: CPT | Performed by: ORTHOPAEDIC SURGERY

## 2021-03-30 PROCEDURE — 82565 ASSAY OF CREATININE: CPT

## 2021-03-30 PROCEDURE — 73110 X-RAY EXAM OF WRIST: CPT

## 2021-03-30 PROCEDURE — 3600000015 HC SURGERY LEVEL 5 ADDTL 15MIN: Performed by: ORTHOPAEDIC SURGERY

## 2021-03-30 PROCEDURE — 7100000010 HC PHASE II RECOVERY - FIRST 15 MIN: Performed by: ORTHOPAEDIC SURGERY

## 2021-03-30 PROCEDURE — 6360000002 HC RX W HCPCS: Performed by: ANESTHESIOLOGY

## 2021-03-30 PROCEDURE — 84132 ASSAY OF SERUM POTASSIUM: CPT

## 2021-03-30 PROCEDURE — 2500000003 HC RX 250 WO HCPCS: Performed by: NURSE ANESTHETIST, CERTIFIED REGISTERED

## 2021-03-30 DEVICE — IMPLANTABLE DEVICE: Type: IMPLANTABLE DEVICE | Site: WRIST | Status: FUNCTIONAL

## 2021-03-30 DEVICE — PEG BNE FIX L18MM WRST THRD TORX FOR VOLAR BEAR PLT: Type: IMPLANTABLE DEVICE | Site: WRIST | Status: FUNCTIONAL

## 2021-03-30 DEVICE — SCREW BNE L12MM DIA3.2MM CORT ULN S STL NONCANNULATED: Type: IMPLANTABLE DEVICE | Site: WRIST | Status: FUNCTIONAL

## 2021-03-30 RX ORDER — BUPIVACAINE HYDROCHLORIDE 5 MG/ML
30 INJECTION, SOLUTION EPIDURAL; INTRACAUDAL ONCE
Status: COMPLETED | OUTPATIENT
Start: 2021-03-30 | End: 2021-03-30

## 2021-03-30 RX ORDER — LABETALOL HYDROCHLORIDE 5 MG/ML
5 INJECTION, SOLUTION INTRAVENOUS EVERY 10 MIN PRN
Status: DISCONTINUED | OUTPATIENT
Start: 2021-03-30 | End: 2021-03-30 | Stop reason: HOSPADM

## 2021-03-30 RX ORDER — PROPOFOL 10 MG/ML
INJECTION, EMULSION INTRAVENOUS PRN
Status: DISCONTINUED | OUTPATIENT
Start: 2021-03-30 | End: 2021-03-30 | Stop reason: SDUPTHER

## 2021-03-30 RX ORDER — FENTANYL CITRATE 50 UG/ML
25 INJECTION, SOLUTION INTRAMUSCULAR; INTRAVENOUS EVERY 5 MIN PRN
Status: DISCONTINUED | OUTPATIENT
Start: 2021-03-30 | End: 2021-03-30 | Stop reason: HOSPADM

## 2021-03-30 RX ORDER — DIPHENHYDRAMINE HYDROCHLORIDE 50 MG/ML
12.5 INJECTION INTRAMUSCULAR; INTRAVENOUS
Status: DISCONTINUED | OUTPATIENT
Start: 2021-03-30 | End: 2021-03-30 | Stop reason: HOSPADM

## 2021-03-30 RX ORDER — OXYCODONE HYDROCHLORIDE AND ACETAMINOPHEN 5; 325 MG/1; MG/1
2 TABLET ORAL PRN
Status: DISCONTINUED | OUTPATIENT
Start: 2021-03-30 | End: 2021-03-30 | Stop reason: HOSPADM

## 2021-03-30 RX ORDER — DEXAMETHASONE SODIUM PHOSPHATE 4 MG/ML
INJECTION, SOLUTION INTRA-ARTICULAR; INTRALESIONAL; INTRAMUSCULAR; INTRAVENOUS; SOFT TISSUE PRN
Status: DISCONTINUED | OUTPATIENT
Start: 2021-03-30 | End: 2021-03-30 | Stop reason: SDUPTHER

## 2021-03-30 RX ORDER — FENTANYL CITRATE 50 UG/ML
50 INJECTION, SOLUTION INTRAMUSCULAR; INTRAVENOUS EVERY 5 MIN PRN
Status: DISCONTINUED | OUTPATIENT
Start: 2021-03-30 | End: 2021-03-30 | Stop reason: HOSPADM

## 2021-03-30 RX ORDER — LIDOCAINE HYDROCHLORIDE 10 MG/ML
INJECTION, SOLUTION EPIDURAL; INFILTRATION; INTRACAUDAL; PERINEURAL PRN
Status: DISCONTINUED | OUTPATIENT
Start: 2021-03-30 | End: 2021-03-30 | Stop reason: SDUPTHER

## 2021-03-30 RX ORDER — HYDROCODONE BITARTRATE AND ACETAMINOPHEN 5; 325 MG/1; MG/1
2 TABLET ORAL PRN
Status: DISCONTINUED | OUTPATIENT
Start: 2021-03-30 | End: 2021-03-30 | Stop reason: HOSPADM

## 2021-03-30 RX ORDER — MORPHINE SULFATE 2 MG/ML
2 INJECTION, SOLUTION INTRAMUSCULAR; INTRAVENOUS EVERY 5 MIN PRN
Status: DISCONTINUED | OUTPATIENT
Start: 2021-03-30 | End: 2021-03-30 | Stop reason: HOSPADM

## 2021-03-30 RX ORDER — OXYCODONE HYDROCHLORIDE AND ACETAMINOPHEN 5; 325 MG/1; MG/1
1 TABLET ORAL PRN
Status: DISCONTINUED | OUTPATIENT
Start: 2021-03-30 | End: 2021-03-30 | Stop reason: HOSPADM

## 2021-03-30 RX ORDER — HYDROCODONE BITARTRATE AND ACETAMINOPHEN 5; 325 MG/1; MG/1
1 TABLET ORAL PRN
Status: DISCONTINUED | OUTPATIENT
Start: 2021-03-30 | End: 2021-03-30 | Stop reason: HOSPADM

## 2021-03-30 RX ORDER — ONDANSETRON 2 MG/ML
4 INJECTION INTRAMUSCULAR; INTRAVENOUS
Status: DISCONTINUED | OUTPATIENT
Start: 2021-03-30 | End: 2021-03-30 | Stop reason: HOSPADM

## 2021-03-30 RX ORDER — SODIUM CHLORIDE, SODIUM LACTATE, POTASSIUM CHLORIDE, CALCIUM CHLORIDE 600; 310; 30; 20 MG/100ML; MG/100ML; MG/100ML; MG/100ML
INJECTION, SOLUTION INTRAVENOUS CONTINUOUS
Status: DISCONTINUED | OUTPATIENT
Start: 2021-03-30 | End: 2021-03-30 | Stop reason: HOSPADM

## 2021-03-30 RX ORDER — FENTANYL CITRATE 50 UG/ML
50 INJECTION, SOLUTION INTRAMUSCULAR; INTRAVENOUS PRN
Status: DISCONTINUED | OUTPATIENT
Start: 2021-03-30 | End: 2021-03-30 | Stop reason: HOSPADM

## 2021-03-30 RX ORDER — OXYCODONE HYDROCHLORIDE AND ACETAMINOPHEN 5; 325 MG/1; MG/1
1 TABLET ORAL EVERY 6 HOURS PRN
Qty: 20 TABLET | Refills: 0 | Status: SHIPPED | OUTPATIENT
Start: 2021-03-30 | End: 2021-04-04

## 2021-03-30 RX ORDER — MIDAZOLAM HYDROCHLORIDE 2 MG/2ML
0.5 INJECTION, SOLUTION INTRAMUSCULAR; INTRAVENOUS 4 TIMES DAILY PRN
Status: DISCONTINUED | OUTPATIENT
Start: 2021-03-30 | End: 2021-03-30 | Stop reason: HOSPADM

## 2021-03-30 RX ORDER — ONDANSETRON 2 MG/ML
INJECTION INTRAMUSCULAR; INTRAVENOUS PRN
Status: DISCONTINUED | OUTPATIENT
Start: 2021-03-30 | End: 2021-03-30 | Stop reason: SDUPTHER

## 2021-03-30 RX ORDER — MAGNESIUM HYDROXIDE 1200 MG/15ML
LIQUID ORAL CONTINUOUS PRN
Status: COMPLETED | OUTPATIENT
Start: 2021-03-30 | End: 2021-03-30

## 2021-03-30 RX ORDER — CEFAZOLIN SODIUM 1 G/3ML
INJECTION, POWDER, FOR SOLUTION INTRAMUSCULAR; INTRAVENOUS PRN
Status: DISCONTINUED | OUTPATIENT
Start: 2021-03-30 | End: 2021-03-30 | Stop reason: SDUPTHER

## 2021-03-30 RX ORDER — CEFAZOLIN SODIUM 1 G/3ML
INJECTION, POWDER, FOR SOLUTION INTRAMUSCULAR; INTRAVENOUS PRN
Status: DISCONTINUED | OUTPATIENT
Start: 2021-03-30 | End: 2021-03-30

## 2021-03-30 RX ADMIN — DEXAMETHASONE SODIUM PHOSPHATE 4 MG: 4 INJECTION, SOLUTION INTRA-ARTICULAR; INTRALESIONAL; INTRAMUSCULAR; INTRAVENOUS; SOFT TISSUE at 15:48

## 2021-03-30 RX ADMIN — LIDOCAINE HYDROCHLORIDE 50 MG: 10 INJECTION, SOLUTION EPIDURAL; INFILTRATION; INTRACAUDAL; PERINEURAL at 15:40

## 2021-03-30 RX ADMIN — CEFAZOLIN 2 MG: 10 INJECTION, POWDER, FOR SOLUTION INTRAVENOUS at 15:42

## 2021-03-30 RX ADMIN — ONDANSETRON 4 MG: 2 INJECTION INTRAMUSCULAR; INTRAVENOUS at 16:16

## 2021-03-30 RX ADMIN — FENTANYL CITRATE 50 MCG: 50 INJECTION, SOLUTION INTRAMUSCULAR; INTRAVENOUS at 14:15

## 2021-03-30 RX ADMIN — PROPOFOL 150 MG: 10 INJECTION, EMULSION INTRAVENOUS at 15:40

## 2021-03-30 RX ADMIN — CEFAZOLIN 2000 MG: 1 INJECTION, POWDER, FOR SOLUTION INTRAMUSCULAR; INTRAVENOUS at 15:45

## 2021-03-30 RX ADMIN — MIDAZOLAM HYDROCHLORIDE 2 MG: 1 INJECTION, SOLUTION INTRAMUSCULAR; INTRAVENOUS at 14:15

## 2021-03-30 RX ADMIN — SODIUM CHLORIDE, POTASSIUM CHLORIDE, SODIUM LACTATE AND CALCIUM CHLORIDE: 600; 310; 30; 20 INJECTION, SOLUTION INTRAVENOUS at 12:47

## 2021-03-30 RX ADMIN — Medication 25 ML: at 14:28

## 2021-03-30 ASSESSMENT — PULMONARY FUNCTION TESTS
PIF_VALUE: 15
PIF_VALUE: 2
PIF_VALUE: 2
PIF_VALUE: 0
PIF_VALUE: 1
PIF_VALUE: 2
PIF_VALUE: 2
PIF_VALUE: 0
PIF_VALUE: 2
PIF_VALUE: 2
PIF_VALUE: 16
PIF_VALUE: 2
PIF_VALUE: 2
PIF_VALUE: 14
PIF_VALUE: 7
PIF_VALUE: 17
PIF_VALUE: 2
PIF_VALUE: 2
PIF_VALUE: 15
PIF_VALUE: 12
PIF_VALUE: 2
PIF_VALUE: 18
PIF_VALUE: 1
PIF_VALUE: 16
PIF_VALUE: 2
PIF_VALUE: 2
PIF_VALUE: 1
PIF_VALUE: 1
PIF_VALUE: 11
PIF_VALUE: 6
PIF_VALUE: 5
PIF_VALUE: 2
PIF_VALUE: 8
PIF_VALUE: 2
PIF_VALUE: 19
PIF_VALUE: 12
PIF_VALUE: 2

## 2021-03-30 ASSESSMENT — PAIN SCALES - GENERAL
PAINLEVEL_OUTOF10: 9
PAINLEVEL_OUTOF10: 0

## 2021-03-30 ASSESSMENT — PAIN - FUNCTIONAL ASSESSMENT: PAIN_FUNCTIONAL_ASSESSMENT: 0-10

## 2021-03-30 NOTE — BRIEF OP NOTE
Brief Postoperative Note      Patient: Desiderio Babinski  YOB: 1950  MRN: [id number]    Date of Procedure: 3/30/2021    Pre-Op Diagnosis: RIGHT DISTAL RADIUS FRACTURE    Post-Op Diagnosis: RIGHT DISTAL RADIUS FRACTURE       Procedure(s):  RIGHT DISTAL RADIUS OPEN REDUCTION INTERNAL FIXATION, TRI MED    Surgeon(s):  Lourdes Gilford, DO    Assistant:  Resident: Freya Gauthier DO; Mercedes Pichardo DO; Michael Torres DO    Anesthesia: General, Regional    Estimated Blood Loss (mL): <10    Fluids: 500cc crystalloid    TT: 28 min    Complications: None    Specimens:   * No specimens in log *    Implants:  Implant Name Type Inv. Item Serial No.  Lot No. LRB No. Used Action   PLATE BNE 3 H 7 PEG STD R VOLAR S STL BEAR  PLATE BNE 3 H 7 PEG STD R VOLAR S STL BEAR  [employer]  Right 1 Implanted   PEG BNE FIX L18MM WRST THRD TORX FOR VOLAR BEAR PLT  PEG BNE FIX L18MM WRST THRD TORX FOR VOLAR BEAR PLT  [employer]  Right 1 Implanted   PEG VOLAR THRD 20MM Screw/Plate/Nail/Haris PEG VOLAR THRD 20MM  [employer]-PMM  Right 3 Implanted   SCREW BNE L12MM DIA3. 2MM J CARLOS ULN S STL NONCANNULATED  SCREW BNE L12MM DIA3. 2MM J CARLOS ULN S STL NONCANNULATED  [employer]  Right 3 Implanted         Drains: * No LDAs found *    Findings: See op note for details.     Electronically signed by Freya Gauthier DO on 3/30/2021 at 4:38 PM

## 2021-03-30 NOTE — H&P
History and Physical    Pt Name: Bairon Rinaldi  MRN: [id number]  YOB: 1950  Date of evaluation: 3/30/2021  Primary Care Physician: Eladio Arias    SUBJECTIVE:   History of Chief Complaint:    Bairon Rinaldi is a 66 y.o. female who is scheduled today for ORIF DISTAL RADIUS- RIGHT. She reports falling in her kitchen last week 3/24/21, and catching her fall with her right wrist. She was treated in the ED after this where they reduced and splinted her right wrist. She reports a prior right wrist fracture in the past treated with casting only. She rates her right wrist pain a 5/10 today. She reports not sleeping well due to pain. She is right hand dominant. Allergies  is allergic to penicillins; seasonal; and other. Medications  Prior to Admission medications    Medication Sig Start Date End Date Taking?  Authorizing Provider   sodium chloride nebulizer 0.9 % solution Take 3 mLs by nebulization daily  3/8/21  Yes Historical Provider, MD   Immune Globulin, Human, (GAMMAGARD IV) Infuse 40 Units intravenously every 14 days   Yes Historical Provider, MD   vitamin D (ERGOCALCIFEROL) 1.25 MG (32594 UT) CAPS capsule Take 50,000 Units by mouth daily   Yes Historical Provider, MD   COLLAGEN PO Take 1 tablet by mouth daily Pop brand supplement   Yes Historical Provider, MD   magnesium (MAGNESIUM-OXIDE) 250 MG TABS tablet Take 1,800 mg by mouth nightly    Yes Historical Provider, MD   diphenhydrAMINE (BENADRYL) 25 MG capsule Take 25 mg by mouth daily And prn for allergies   Yes Historical Provider, MD   omeprazole (PRILOSEC) 20 MG delayed release capsule Take 20 mg by mouth 2 times daily   Yes Historical Provider, MD   potassium chloride (MICRO-K) 10 MEQ extended release capsule Take 10 mEq by mouth 2 times daily   Yes Historical Provider, MD   metFORMIN (GLUCOPHAGE) 500 MG tablet Take 500 mg by mouth 2 times daily (with meals)   Yes Historical Provider, MD   levocetirizine (XYZAL) 5 MG tablet Take 5 mg by mouth nightly    Yes Historical Provider, MD   ferrous sulfate (IRON 325) 325 (65 Fe) MG tablet Take 325 mg by mouth 2 times daily   Yes Historical Provider, MD   calcium citrate-vitamin D (CITRICAL + D) 315-250 MG-UNIT TABS per tablet Take 2 tablets by mouth 2 times daily (with meals) Lunch and bed time   Yes Historical Provider, MD   Ascorbic Acid (VITAMIN C) 500 MG CHEW Take 2 tablets by mouth daily    Yes Historical Provider, MD   FLUTICASONE PROPIONATE, NASAL, NA 1 puff by Nasal route daily    Yes Historical Provider, MD   albuterol sulfate  (90 Base) MCG/ACT inhaler Inhale 2 puffs into the lungs every 6 hours as needed    Historical Provider, MD   zoledronic acid (RECLAST) 5 MG/100ML SOLN Infuse 1 Dose intravenously as needed 1 time every year, last dose 2020    Historical Provider, MD     Past Medical History    has a past medical history of Arthritis, Bronchiectasis ([address] Claustrophobia, Diabetes mellitus ([address] Fall, Fatty liver, Fracture of right radius, Hiatal hernia with GERD, Immune deficiency disorder ([address] Osteoporosis, Snores, Stomach problems, Wears glasses, and Wellness examination. Past Surgical History   has a past surgical history that includes Upper gastrointestinal endoscopy; Colonoscopy; and Dilation and curettage of uterus. Social History   reports that she has never smoked. She has never used smokeless tobacco.   reports current alcohol use of about 2.0 standard drinks of alcohol per week. reports no history of drug use. Marital Status   Children   Occupation retired teacher  Family History  Family Status   Relation Name Status    Mother     Mcdonald Father       family history includes Diabetes in her father and mother; Heart Failure in her father; Stroke in her father. OBJECTIVE:   VITALS:  height is 5' 6\" (1.676 m) and weight is 108 lb (49 kg). Her temporal temperature is 97.9 °F (36.6 °C). Her blood pressure is 129/58 (abnormal) and her pulse is 88.  Her respiration is 16 and oxygen saturation is 99%. CONSTITUTIONAL:alert & oriented x 3, no acute distress. Friendly. Very pleasant. Thin petite frame. SKIN:  Warm and dry, no rashes on exposed areas of skin   HEAD:  Normocephalic, atraumatic   EYES: PERRL. EOMs intact. EARS:  Equal bilaterally, no edema or thickening, skin is intact without lumps or lesions. No discharge. Hearing grossly WNL. NOSE:  Nares patent. No rhinorrhea   MOUTH/THROAT:  Mucous membranes moist, tongue is pink, uvula midline, teeth appear to be intact  NECK:supple, no lymphadenopathy  LUNGS: Respirations even and non-labored. Clear to auscultation bilaterally, no wheezes, rales, or rhonchi. CARDIOVASCULAR: Regular rate and rhythm, no murmurs/rubs/gallops   ABDOMEN: soft, non-tender, non-distended, bowel sounds active x 4   EXTREMITIES: No edema bilateral lower extremities. No varicosities bilateral lower extremities. Right wrist splinted. Right fingers with brisk cap refill, cool to touch, mobility intact exposed finger  NEUROLOGIC: CN II-XII are grossly intact. Gait not assessed. IMPRESSIONS:   Right distal radius fracture      Diagnosis Date    Arthritis     Bronchiectasis ([address]     on saline aerosol daily, not currentlt following with pulmonology    Claustrophobia     Diabetes mellitus ([address]     managed per Dr. Kennedy Bailey    Fall 03/24/2021    fractured right wrist    Fatty liver     Fracture of right radius 03/26/2021    fell in her kitchen as she got up from her seat    Hiatal hernia with GERD     Immune deficiency disorder ([address]     gets infusions every 2 weeks, follows with Dr. Meryle Quin Osteoporosis     Snores     Stomach problems     follows with ALEM Carey CNP, \" Gassy \" , GERD, hiatel hernia, ? fatty liver     Wears glasses     Wellness examination     Dr. Kennedy Bailey , PCP, has not seen since 10/2019   1.    PLANS:   ORIF DISTAL RADIUS- RIGHT    IMANIMASHA FRAZIER-CNP  Electronically signed 3/30/2021 at 12:44 PM

## 2021-03-30 NOTE — ANESTHESIA PROCEDURE NOTES
Peripheral Block    Patient location during procedure: pre-op  Start time: 3/30/2021 2:15 PM  End time: 3/30/2021 2:19 PM  Staffing  Performed: anesthesiologist   Anesthesiologist: Zenia Zeng MD  Peripheral Block  Patient position: sitting  Prep: ChloraPrep  Patient monitoring: cardiac monitor, continuous pulse ox, frequent blood pressure checks and IV access  Block type: Brachial plexus  Laterality: right  Injection technique: single-shot  Guidance: ultrasound guided  Local infiltration: lidocaine  Infiltration strength: 1 %  Dose: 3 mL  Supraclavicular  Provider prep: mask and sterile gloves  Local infiltration: lidocaine  Needle  Needle type: short-bevel   Needle gauge: 20 G  Needle length: 10 cm  Needle localization: ultrasound guidance  Test dose: negative  Assessment  Injection assessment: negative aspiration for heme, no paresthesia on injection and local visualized surrounding nerve on ultrasound  Slow fractionated injection: yes  Hemodynamics: stable  Additional Notes  Versed 2 mg, fent 1 cc  25 cc PF marcaine, dd, neg asp  Reason for block: post-op pain management

## 2021-03-30 NOTE — ANESTHESIA POSTPROCEDURE EVALUATION
Department of Anesthesiology  Postprocedure Note    Patient: Brandy Rich  MRN: [id number]  YOB: 1950  Date of evaluation: 3/30/2021  Time:  5:56 PM     Procedure Summary     Date: 03/30/21 Room / Location: [address]    Anesthesia Start: [facility] Anesthesia Stop: 9240    Procedure: OPEN REDUCTION INTERNAL FIXATION  DISTAL RADIUS, APPLICATION OF SPLINT TRI MED, (Right ) Diagnosis: (FRACTURED RIGHT DISTAL RADIUS)    Surgeons: Xiomara Glover DO Responsible Provider: Ilana Costello MD    Anesthesia Type: general, regional ASA Status: 3          Anesthesia Type: general, regional    Joselin Phase I:      Joselin Phase II:      Last vitals: Reviewed and per EMR flowsheets.        Anesthesia Post Evaluation    Patient location during evaluation: PACU  Patient participation: complete - patient participated  Level of consciousness: awake and alert  Pain score: 2  Airway patency: patent  Nausea & Vomiting: no nausea and no vomiting  Complications: no  Cardiovascular status: hemodynamically stable  Respiratory status: acceptable  Hydration status: euvolemic

## 2021-03-30 NOTE — ANESTHESIA PRE PROCEDURE
Department of Anesthesiology  Preprocedure Note       Name:  Susan Mayer   Age:  66 y.o.  :  1950                                          MRN:  [id number]         Date:  3/30/2021      Surgeon: Heather Lopez):  Darci Raymond DO    Procedure: Procedure(s):  ORIF DISTAL RADIUS, TRI MED, INTERSCALENE BLOCK, 3080 TABLE, SUPINE    Department of Anesthesiology  Pre-Anesthesia Evaluation/Consultation         Name:  Susan Mayer                                         Age:  66 y.o. MRN:  [id number]             Medications  Current Facility-Administered Medications   Medication Dose Route Frequency Provider Last Rate Last Admin    lactated ringers infusion   Intravenous Continuous Alan Mcclendon  mL/hr at 21 1247 New Bag at 21 1247    ceFAZolin (ANCEF) 2000 mg in dextrose 5 % 50 mL IVPB  2,000 mg Intravenous On Call to [address], DO           Allergies   Allergen Reactions    Penicillins Itching and Rash    Seasonal Other (See Comments)     Allergic rhinitis    Other Diarrhea, Nausea And Vomiting and Other (See Comments)     \"all seafood\"    headache     There is no problem list on file for this patient.     Past Medical History:   Diagnosis Date    Arthritis     Bronchiectasis ([address]     on saline aerosol daily, not currentlt following with pulmonology    Claustrophobia     Diabetes mellitus ([address]     managed per Dr. Jennifer Ruiz    Fall 2021    fractured right wrist    Fatty liver     Fracture of right radius 2021    fell in her kitchen as she got up from her seat    Hiatal hernia with GERD     Immune deficiency disorder ([address]     gets infusions every 2 weeks, follows with Dr. Lacy Sim Osteoporosis     Snores     Stomach problems     follows with GI Tawanna Tanner CNP, \" Gassy \" , GERD, hiatel hernia, ? fatty liver     Wears glasses     Wellness examination     Dr. Jennifer Ruiz , PCP, has not seen since 10/2019     Past Surgical History:   Procedure Laterality Date    COLONOSCOPY      multiple with polypectomies    DILATION AND CURETTAGE OF UTERUS      UPPER GASTROINTESTINAL ENDOSCOPY      multiple with esophageal dilitations     Social History     Tobacco Use    Smoking status: Never Smoker    Smokeless tobacco: Never Used   Substance Use Topics    Alcohol use: Yes     Alcohol/week: 2.0 standard drinks     Types: 2 Glasses of wine per week     Comment: 1 time per week    Drug use: Never         Vital Signs (Current)   Vitals:    21 1236   BP: (!) 129/58   Pulse: 88   Resp: 16   Temp: 97.9 °F (36.6 °C)   SpO2: 99%     Vital Signs Statistics (for past 48 hrs)     Temp  Av.9 °F (36.6 °C)  Min: 97.9 °F (36.6 °C)   Min taken time: 21 1236  Max: 97.9 °F (36.6 °C)   Max taken time: 21 1236  Pulse  Av  Min: 88   Min taken time: 21 1236  Max: 88   Max taken time: 21 1236  Resp  Av  Min: 16   Min taken time: 21 1236  Max: 16   Max taken time: 21 1236  BP  Min: 129/58   Min taken time: 21 1236  Max: 129/58   Max taken time: 21 1236  SpO2  Av %  Min: 99 %   Min taken time: 21 1236  Max: 99 %   Max taken time: 21 1236  BP Readings from Last 3 Encounters:   21 (!) 129/58   21 131/66       BMI  Body mass index is 17.43 kg/m².     CBC   Lab Results   Component Value Date    WBC 11.8 2021    RBC 4.24 2021    HGB 12.6 2021    HCT 40.6 2021    MCV 95.8 2021    RDW 12.7 2021     2021       CMP    Lab Results   Component Value Date     2021    K 3.2 2021    CL 99 2021    CO2 28 2021    BUN 11 2021    CREATININE 0.56 2021    CREATININE 0.46 2021    GFRAA >60 2021    LABGLOM >60 2021    GLUCOSE 100 2021    PROT 7.5 2021    CALCIUM 9.0 2021    BILITOT 0.31 2021    ALKPHOS 286 2021    AST 34 2021    ALT 58 2021       BMP    Lab Results   Component Value Date     03/26/2021    K 3.2 03/26/2021    CL 99 03/26/2021    CO2 28 03/26/2021    BUN 11 03/26/2021    CREATININE 0.56 03/30/2021    CREATININE 0.46 03/26/2021    CALCIUM 9.0 03/26/2021    GFRAA >60 03/26/2021    LABGLOM >60 03/30/2021    GLUCOSE 100 03/26/2021       POC Testing  Recent Labs     03/30/21  1244   POCGLU 98   POCK 4.2       Coags  No results found for: PROTIME, INR, APTT    HCG (If Applicable) No results found for: PREGTESTUR, PREGSERUM, HCG, HCGQUANT     ABGs No results found for: PHART, PO2ART, HGZ3GOK, HUD8REK, BEART, O1UPKXRC     Type & Screen (If Applicable)  No results found for: [facility]    Radiology (If Applicable)    Cardiac Testing (If Applicable)     EKG (If Applicable)   PAC's bigeminy      Medications prior to admission:   Prior to Admission medications    Medication Sig Start Date End Date Taking?  Authorizing Provider   sodium chloride nebulizer 0.9 % solution Take 3 mLs by nebulization daily  3/8/21  Yes Historical Provider, MD   Immune Globulin, Human, (GAMMAGARD IV) Infuse 40 Units intravenously every 14 days   Yes Historical Provider, MD   vitamin D (ERGOCALCIFEROL) 1.25 MG (95593 UT) CAPS capsule Take 50,000 Units by mouth daily   Yes Historical Provider, MD   COLLAGEN PO Take 1 tablet by mouth daily Pop brand supplement   Yes Historical Provider, MD   magnesium (MAGNESIUM-OXIDE) 250 MG TABS tablet Take 1,800 mg by mouth nightly    Yes Historical Provider, MD   diphenhydrAMINE (BENADRYL) 25 MG capsule Take 25 mg by mouth daily And prn for allergies   Yes Historical Provider, MD   omeprazole (PRILOSEC) 20 MG delayed release capsule Take 20 mg by mouth 2 times daily   Yes Historical Provider, MD   potassium chloride (MICRO-K) 10 MEQ extended release capsule Take 10 mEq by mouth 2 times daily   Yes Historical Provider, MD   metFORMIN (GLUCOPHAGE) 500 MG tablet Take 500 mg by mouth 2 times daily (with meals)   Yes Historical Provider, MD hernia with GERD     Immune deficiency disorder [address]     gets infusions every 2 weeks, follows with Dr. Kash Tobin Osteoporosis     Snores     Stomach problems     follows with ALEM Bridges CNP, \" Gassy \" , GERD, hiatel hernia, ? fatty liver     Wears glasses     Wellness examination     Dr. Holden Muniz , PCP, has not seen since 10/2019       Past Surgical History:        Procedure Laterality Date    COLONOSCOPY      multiple with polypectomies    DILATION AND CURETTAGE OF UTERUS      UPPER GASTROINTESTINAL ENDOSCOPY      multiple with esophageal dilitations       Social History:    Social History     Tobacco Use    Smoking status: Never Smoker    Smokeless tobacco: Never Used   Substance Use Topics    Alcohol use: Yes     Alcohol/week: 2.0 standard drinks     Types: 2 Glasses of wine per week     Comment: 1 time per week                                Counseling given: Not Answered      Vital Signs (Current):   Vitals:    03/26/21 1403 03/30/21 1204 03/30/21 1210 03/30/21 1236   BP:    (!) 129/58   Pulse:    88   Resp:    16   Temp:    97.9 °F (36.6 °C)   TempSrc:    Temporal   SpO2:    99%   Weight: 113 lb (51.3 kg) 113 lb (51.3 kg) 108 lb (49 kg)    Height: 5' 6\" (1.676 m) 5' 6\" (1.676 m) 5' 6\" (1.676 m)                                               BP Readings from Last 3 Encounters:   03/30/21 (!) 129/58   03/24/21 131/66       NPO Status: Time of last liquid consumption: 2359                        Time of last solid consumption: 2359                        Date of last liquid consumption: 03/29/21                        Date of last solid food consumption: 03/29/21    BMI:   Wt Readings from Last 3 Encounters:   03/30/21 108 lb (49 kg)   03/26/21 113 lb (51.3 kg)   03/24/21 113 lb (51.3 kg)     Body mass index is 17.43 kg/m².     CBC:   Lab Results   Component Value Date    WBC 11.8 03/26/2021    RBC 4.24 03/26/2021    HGB 12.6 03/26/2021    HCT 40.6 03/26/2021    MCV 95.8 03/26/2021    RDW 12.7 03/26/2021     03/26/2021       CMP:   Lab Results   Component Value Date     03/26/2021    K 3.2 03/26/2021    CL 99 03/26/2021    CO2 28 03/26/2021    BUN 11 03/26/2021    CREATININE 0.56 03/30/2021    CREATININE 0.46 03/26/2021    GFRAA >60 03/26/2021    LABGLOM >60 03/30/2021    GLUCOSE 100 03/26/2021    PROT 7.5 03/26/2021    CALCIUM 9.0 03/26/2021    BILITOT 0.31 03/26/2021    ALKPHOS 286 03/26/2021    AST 34 03/26/2021    ALT 58 03/26/2021       POC Tests:   Recent Labs     03/30/21  1244   POCGLU 98   POCK 4.2       Coags: No results found for: PROTIME, INR, APTT    HCG (If Applicable): No results found for: PREGTESTUR, PREGSERUM, HCG, HCGQUANT     ABGs: No results found for: PHART, PO2ART, MXT5BZR, MAK4LIN, BEART, E3LHRDRI     Type & Screen (If Applicable):  No results found for: LABABO, LABRH    Drug/Infectious Status (If Applicable):  No results found for: HIV, HEPCAB    COVID-19 Screening (If Applicable):   Lab Results   Component Value Date    COVID19 Not Detected 03/26/2021           Anesthesia Evaluation   no history of anesthetic complications:   Airway: Mallampati: II     Neck ROM: full   Dental:          Pulmonary:       (-) recent URI                          ROS comment: bronchectasis  Probable sleep apnea,snores like a marine   Cardiovascular:  Exercise tolerance: good (>4 METS),                     Neuro/Psych:      (-) seizures and CVA            ROS comment: claustrobpphobia GI/Hepatic/Renal:   (+) hiatal hernia, GERD:,          ROS comment: steatosis. Endo/Other:    (+) Diabeteswell controlled, , : arthritis:., .                  ROS comment: osteoporosis Abdominal:           Vascular:                                      Anesthesia Plan      general and regional     ASA 3       Induction: intravenous.                           Timothy Velazco MD   3/30/2021

## 2021-03-30 NOTE — PROGRESS NOTES
[id number] Dr Shama Rosales to the bedside, time out performed, Pt monitored, 02,  Right Supraclavicular single shotnerve block completed using Bupivacaine, 0.5% 25 ml,  pt tolerated procedure well,  Site CDI, (see charting), vss, Versed Given: 2 mg  Fentanyl  50 mcg, pt denies co pain or discomfort, family back to the bedside,

## 2021-03-31 NOTE — OP NOTE
fixation, loss of reduction, hardware failure, angular or rotational deformity, length discrepancy, limp, transfusion, skin blistering or breakdown, progressive post traumatic degenerative joint disease, possible need for further surgery, bone grafting, infection, nerve injury, paralysis, numbness, blood vessel injury, excessive scaring, wound complication or breakdown, failure of symptoms to improve or actual deterioration in condition, significant acute and/or chronic pain, possible need for amputation, permanent loss of motion, and permanent loss of function. As well as the general complications of anesthesia, which include but are not limited to: myocardial infarction and/or heart attack, stroke, multi organ system failure or even possible death, prolonged hospital stay, blood clots, pulmonary embolism, abnormal reaction to medication, visual and neurological disturbances, constipation, ischemic bowel, bowel obstruction, bowel perforation, ileus and mental status changes. No guarantees were made. The patient was counseled at length about the risks of nini Covid-19 during their perioperative period and any recovery window from their procedure. The patient was made aware that nini Covid-19  may worsen their prognosis for recovering from their procedure  and lend to a higher morbidity and/or mortality risk. All material risks, benefits, and reasonable alternatives including postponing the procedure were discussed. The patient does wish to proceed with the procedure at this time. Procedure: On the day of surgery the patient was met in the preoperative unit where written consent was obtained and the operative site was marked with permanent marker. The patient was then wheeled back to the operating suite and laid in the supine position on the OR table with the right upper extremity on the hand table. A well padded non-sterile tourniquet was applied to the right upper extremity.  Appropriate applied with ACE bandage. Tourniquet was let down for a total time of 28 minutes. At this time anesthesia was reversed and the patient was extubated without complication. The patient was moved back over to the hospital bed and moved to the recovery unit in stable condition. Dr. Darwin Aaron was present for and active in all critical aspects of the case.         Electronically signed by Ai Gaston DO on 3/31/2021 at 10:38 AM

## 2021-04-08 DIAGNOSIS — M25.531 RIGHT WRIST PAIN: Primary | ICD-10-CM

## 2021-04-14 ENCOUNTER — OFFICE VISIT (OUTPATIENT)
Dept: ORTHOPEDIC SURGERY | Age: 71
End: 2021-04-14

## 2021-04-14 VITALS
RESPIRATION RATE: 12 BRPM | SYSTOLIC BLOOD PRESSURE: 148 MMHG | WEIGHT: 109 LBS | BODY MASS INDEX: 17.52 KG/M2 | DIASTOLIC BLOOD PRESSURE: 74 MMHG | HEIGHT: 66 IN | HEART RATE: 93 BPM

## 2021-04-14 DIAGNOSIS — S52.501D CLOSED FRACTURE OF DISTAL END OF RIGHT RADIUS WITH ROUTINE HEALING, UNSPECIFIED FRACTURE MORPHOLOGY, SUBSEQUENT ENCOUNTER: Primary | ICD-10-CM

## 2021-04-14 PROCEDURE — 99024 POSTOP FOLLOW-UP VISIT: CPT | Performed by: ORTHOPAEDIC SURGERY

## 2021-04-14 RX ORDER — TRAMADOL HYDROCHLORIDE 50 MG/1
50 TABLET ORAL EVERY 6 HOURS PRN
Qty: 28 TABLET | Refills: 0 | Status: SHIPPED | OUTPATIENT
Start: 2021-04-14 | End: 2021-04-21

## 2021-04-14 ASSESSMENT — ENCOUNTER SYMPTOMS
CHEST TIGHTNESS: 0
VOMITING: 0
APNEA: 0
COUGH: 0
ABDOMINAL PAIN: 0

## 2021-04-14 NOTE — PROGRESS NOTES
have reviewed their documentation prior to providing my signature indicating agreement. Objective :   General: Aramis Hernández is a 66 y.o. female who is alert and oriented and sitting comfortably in our office. Ortho Exam  MS:   Incision healing well to the right wrist without sign of infection. Motor, sensory, vascular examination to the right upper extremity is grossly intact without focal deficits. Neuro: alert. oriented  Eyes: Extra-ocular muscles intact  Mouth: Oral mucosa moist. No perioral lesions  Pulm: Respirations unlabored and regular. Skin: warm, well perfused  Psych:   Patient has good fund of knowledge and displays understanging of exam, diagnosis, and plan. Radiology:     Xr Chest (2 Vw)    Result Date: 3/26/2021  EXAMINATION: TWO XRAY VIEWS OF THE CHEST 3/26/2021 9:12 am COMPARISON: None. HISTORY: ORDERING SYSTEM PROVIDED HISTORY: Pre-op testing Reason for Exam: no chest complaints FINDINGS: The lungs are without acute focal process. No effusion or pneumothorax. The cardiomediastinal silhouette is normal.  The osseous structures are intact without acute process. No acute process. Xr Wrist Right (min 3 Views)    Result Date: 4/18/2021  History: Right wrist status post open reduction and internal fixation of distal radius fracture Findings: AP, lateral, oblique x-rays of the right wrist done in the office today shows distal radius fracture healing in anatomic position with hardware in good position without complications. No further evidence of fracture, subluxation, dislocation, radiopaque foreign body, radiopaque tumors noted. Impression: Right distal radius fracture healing in near-anatomic position with hardware in good position without complications as described above. Xr Wrist Right (min 3 Views)    Result Date: 3/30/2021  EXAMINATION: 3 XRAY VIEWS OF THE RIGHT WRIST 3/30/2021 4:42 pm COMPARISON: March 30, 2021.  HISTORY: ORDERING SYSTEM PROVIDED HISTORY: post op, PACU please TECHNOLOGIST PROVIDED HISTORY: post op, PACU please FINDINGS: Frontal, lateral, and oblique view radiographs of the right wristwere obtained. Bone mineralization is normal. There is a healing internally-fixed fracture of the distal radius in cast which remains in stable alignment. Joint relationships are maintained. Additional callus formation is present. No radiographic evidence of hardware complication. Mild diffuse soft tissue swelling remains. Healing internally-fixed distal radius fracture in stable, near anatomic alignment. Xr Wrist Right (min 3 Views)    Result Date: 3/24/2021  EXAMINATION: 3 XRAY VIEWS OF THE RIGHT WRIST 3/24/2021 2:55 pm COMPARISON: None. HISTORY: ORDERING SYSTEM PROVIDED HISTORY: post-reduction TECHNOLOGIST PROVIDED HISTORY: post-reduction Reason for Exam: post reduction Acuity: Unknown Type of Exam: Unknown FINDINGS: Improved distal dorsal angulation status post reduction of the comminuted distal radius fracture. Overlying splinting material otherwise obscures fine osseous detail. Unchanged alignment of the ulnar styloid fracture. Improved alignment of the distal radius fracture status post reduction and splinting. Xr Wrist Right (min 3 Views)    Result Date: 3/24/2021  EXAMINATION: 4 XRAY VIEWS OF THE RIGHT WRIST; THREE XRAY VIEWS OF THE RIGHT HAND 3/24/2021 1:57 pm COMPARISON: None. HISTORY: ORDERING SYSTEM PROVIDED HISTORY: pain, [address] TECHNOLOGIST PROVIDED HISTORY: pain, [address] Reason for Exam: Pt states fall, [address] RT wrist pain and deformity Acuity: Acute Type of Exam: Initial; ORDERING SYSTEM PROVIDED HISTORY: fall, [address] TECHNOLOGIST PROVIDED HISTORY: fall, [address] Reason for Exam: Pt states fall, [address] RT wrist pain and deformity Acuity: Acute Type of Exam: Initial FINDINGS: Comminuted and impacted distal radius fracture with approximately 45 degrees distal dorsal angulation. Nondisplaced ulnar styloid fracture.   Moderate soft tissue swelling about the wrist.  Degenerative changes at the interphalangeal joints and radial sided wrist on a background of osseous demineralization. Comminuted and impacted distal radius fracture with nearly 45 degrees of distal dorsal angulation. Nondisplaced ulnar styloid fracture. Xr Hand Right (min 3 Views)    Result Date: 3/24/2021  EXAMINATION: 4 XRAY VIEWS OF THE RIGHT WRIST; THREE XRAY VIEWS OF THE RIGHT HAND 3/24/2021 1:57 pm COMPARISON: None. HISTORY: ORDERING SYSTEM PROVIDED HISTORY: pain, [address] TECHNOLOGIST PROVIDED HISTORY: pain, [address] Reason for Exam: Pt states fall, [address] RT wrist pain and deformity Acuity: Acute Type of Exam: Initial; ORDERING SYSTEM PROVIDED HISTORY: fall, [address] TECHNOLOGIST PROVIDED HISTORY: fall, [address] Reason for Exam: Pt states fall, [address] RT wrist pain and deformity Acuity: Acute Type of Exam: Initial FINDINGS: Comminuted and impacted distal radius fracture with approximately 45 degrees distal dorsal angulation. Nondisplaced ulnar styloid fracture. Moderate soft tissue swelling about the wrist.  Degenerative changes at the interphalangeal joints and radial sided wrist on a background of osseous demineralization. Comminuted and impacted distal radius fracture with nearly 45 degrees of distal dorsal angulation. Nondisplaced ulnar styloid fracture. Fluoro For Surgical Procedures    Result Date: 3/30/2021  Radiology exam is complete. No Radiologist dictation. Please follow up with ordering provider. Assessment:      1. Closed fracture of distal end of right radius with routine healing, unspecified fracture morphology, subsequent encounter         Plan:    reviewed x-rays with the patient today in the office. Discussed with the patient that everything is healing well. Wrist brace today so she is able to take it off and work on range of motion. If not better in 2 weeks come in to get referral to hand therapy.  Patient was supplied with tramadol prescription today to help with her pain. Patient should not complete and heavy lifting or gripping at this time. Patient to follow up in 4 weeks. The patient knows to call with any questions or concerns. Follow up: Return in about 4 weeks (around 5/12/2021). Orders Placed This Encounter   Medications    traMADol (ULTRAM) 50 MG tablet     Sig: Take 1 tablet by mouth every 6 hours as needed for Pain for up to 7 days. Dispense:  28 tablet     Refill:  0     Reduce doses taken as pain becomes manageable    traMADol (ULTRAM) 50 MG tablet     Sig: Take 1 tablet by mouth every 6 hours as needed for Pain for up to 7 days. Intended supply: 7 days. Take lowest dose possible to manage pain     Dispense:  28 tablet     Refill:  0     Reduce doses taken as pain becomes manageable       No orders of the defined types were placed in this encounter. Lexi Coughlin LPN am scribing for and in the presence of Dr. Vish Bryson  4/18/2021 8:51 PM    I have reviewed and made changes accordingly to the work scribed by Keven Fox LPN. The documentation accurately reflects work and decisions made by me. I have also reviewed documentation completed by clinical staff.     Vish Bryson DO, [facility]  4/18/2021 8:52 PM    This note is created with the assistance of a speech recognition program.  While intending to generate a document that actually reflects the content of the visit, the document can still have some errors including those of syntax and sound a like substitutions which may escape proof reading.  In such instances, actual meaning can be extrapolated by contextual diversion      Electronically signed by Brock Jansen DO, Bobbette Lambing on 4/18/2021 at 8:51 PM

## 2021-04-26 ENCOUNTER — TELEPHONE (OUTPATIENT)
Dept: ORTHOPEDIC SURGERY | Age: 71
End: 2021-04-26

## 2021-04-26 DIAGNOSIS — S52.501A CLOSED FRACTURE OF DISTAL END OF RIGHT RADIUS, UNSPECIFIED FRACTURE MORPHOLOGY, INITIAL ENCOUNTER: Primary | ICD-10-CM

## 2021-04-26 NOTE — TELEPHONE ENCOUNTER
Pt. Called in stating she is having a hard time using and moving her wrist and would like a referral for physical therapy     dos 3/30/21 ORIF R distal radius fx

## 2021-05-03 ENCOUNTER — HOSPITAL ENCOUNTER (OUTPATIENT)
Dept: PHYSICAL THERAPY | Facility: CLINIC | Age: 71
Setting detail: THERAPIES SERIES
Discharge: HOME | End: 2021-05-03
Payer: MEDICARE

## 2021-05-03 PROCEDURE — 97162 PT EVAL MOD COMPLEX 30 MIN: CPT

## 2021-05-03 PROCEDURE — 97110 THERAPEUTIC EXERCISES: CPT

## 2021-05-03 NOTE — FLOWSHEET NOTE
[facility]. [address] [place] [address]  [address], [facility] 36.  Phone: [phone]  Fax:     [phone]    Physical Therapy Evaluation    Date:  5/3/2021  Patient: Amarilys Castro  : 1950  MRN: [id number]  Physician: [address]: MEDICARE              Eligibility Status:  Eligible     Secondary Insurance (if applicable) [employer]              Eligibility Status: Eligible      DOS: 5.3.21  # of visits allowed/remaining: Based on medical necessity  Source: Website  Spoke To:  [employer]  Reference: [id number]  Medical Diagnosis: S/P ORIF R distal radius fx    Rehab Codes: S52.501A  Onset Date: 3/3021                                   Subjective:  Pt report pain, stiffness of R wrist, thumb, finger regions. Pt states she injured wrist 3/24/21 when she fell in kitchen and landed on R arm. XR revealed comminuted and impacted distal radius fracture with nearly 45 degrees of distal dorsal angulation. Nondisplaced ulnar styloid fracture. Pt underwent ORIF of R wrist 3/30/21. Pt placed in rigid splint x 3 wks, then transitioned to wrist brace. Pt now presents to begin wrist/hand rehabilitation. Pt previous hx or R wrist fx.     PMHx: [] Unremarkable [] Diabetes [] HTN  [] Pacemaker   [] MI/Heart Problems [] Cancer [] Arthritis [] Asthma                         [x] refer to full medical chart  In [facility]  [] Other:        Tests: [x] X-Ray: [] MRI:  [] Other:    Medications: [x] Refer to full medical record [] None [] Other:  Allergies:      [x] Refer to full medical record [] None [] Other:    Function:  Hand Dominance  [x] Right  [] Left  Working:  [] Normal Duty  [] Light Duty  [] Off D/T Condition  [x] Retired     [] Not Employed  []  Disability  [] Other:            Return to work:   Job/ADL Description: Retired , enjoys gardening and crafts    Pain:  [x] Yes  [] No Pain Rating: (0-10 scale) 3/10  Pain altered Tx:  [] Yes  [x] No  Action:    Symptoms: [] Improving [] Worsening [x] Same    Objective:   L/R   ROM  ° A/P STRENGTH    Elbow Flex WNL WNL 4+ 4-    Ext WNL WNL 4+ 4-    Pron WNL 45 4+ 3+    Sup WNL 30 4+ 3+    Wrist flex WNL 30 4+ 3+    ext WNL 5 4+ 3+    1st PIP flex WNL 5-10 4+ 2+    DIP flex WNL 5-20 4+ 2+    2nd PIP flex WNL 10-80 4+ 2+    MP flex WNL 5-90 4+ 2+    DIP flex WNL 0-10 4+ 2+    3rd PIP flex WNL 5-90 4+ 2+    MP flex WNL 0-100 4+ 2+    DIP dionisio WNL 0-10 4+ 2+    4th PIP flex WNL 5-90 4+ 2+    MP flex WNL 5-100 4+ 2+    DIP flex WNL 0-10 4+ 2+    5th PIP flex WNL 0-90 4+ 2+    MP flex WNL 0-95 4+ 2+    DIP flex WNL 0-10 4+ 2+      OBSERVATION No Deficit Deficit Not Tested Comments   Palpation [] [x]     Sensation [x] []     Edema [x] [x]       FUNCTION Normal Difficult Unable   Gripping [] [x] []   Lifting [] [x] []   Twisting [] [x] []     ASSESSMENT   Pt limited by R wrist, hand pain, ROM loss, hand, wrist, elbow weakness w/ limitations in functional activties S/P ORIF R distal radius fx. Will initiate wrist, hand passive stretching, ROM exercises    PROBLEMS  Wrist/hand pain  Wrist/hand ROM loss  Elbow, wrist, hand  weakness  Wrist/hand functional deficits    SHORT TERM GOALS ( 8 visits)  Wrist/hand pain = 0  Wrist/hand ROM = WNL  Elbow, wrist, hand strength = 4+/5  Wrist/hand function: , lift, twist w/o pain    LONG TERM GOALS ( 12 visits)  Independent Home Exercise program  Return to normal activity    PATIENT GOAL  Better use of hand    Rehab Potential:  [x] Good  [] Fair  [] Poor   Suggested Professional Referral:  [x] No  [] Yes:  Barriers to Goal Achievement[de-identified]  [x] No  [] Yes:  Domestic Concerns:  [x] No  [] Yes:    Pt. Education:  [x] Plans/Goals, Risks/Benefits discussed  [x] Home exercise program  Method of Education: [x] Verbal  [x] Demo  [x] Written  Comprehension of Education:  [x] Verbalizes understanding. [x] Demonstrates understanding. [] Needs Review.   [] Demonstrates/verbalizes understanding of HEP/Ed previously

## 2021-05-07 ENCOUNTER — HOSPITAL ENCOUNTER (OUTPATIENT)
Dept: PHYSICAL THERAPY | Facility: CLINIC | Age: 71
Setting detail: THERAPIES SERIES
Discharge: HOME | End: 2021-05-07
Payer: MEDICARE

## 2021-05-07 PROCEDURE — 97110 THERAPEUTIC EXERCISES: CPT

## 2021-05-07 PROCEDURE — 97140 MANUAL THERAPY 1/> REGIONS: CPT

## 2021-05-07 NOTE — FLOWSHEET NOTE
[] [facility]  [address]  [phone]  F: [phone] [] [address]  [email or web address] [address]   [address]  P: [phone]  F: [phone] [x] [address]  Therapy  [address]  P: [phone]  F: [phone] [] [address]  P: [phone]  F: [phone] [] [address]  [facility]   [address]   [place]: [phone]  F: [phone]      Physical Therapy Daily Treatment Note    Date:  2021  Patient Name:  Radha Chase    :  1950  MRN: [id number]  Physician:  [address]: MEDICARE              Eligibility Status:  Eligible     Secondary Insurance (if applicable) [employer]              Eligibility Status: Eligible      DOS: 5.3.21  # of visits allowed/remaining: Based on medical necessity  Source: Website  Spoke To:  [employer] [facility]  Reference: [id number]  Medical Diagnosis: S/P ORIF R distal radius fx                    Rehab Codes: S52.501A  Onset Date: 3/3021          Next Dr. Shaina Maravilla:   Visit# / total visits: ; Progress note for Medicare patient due at visit 10     Cancels/No Shows:     Subjective:    Pain:  [x] Yes  [x] No Location: R wrist Pain Rating: (0-10 scale) 0/10  Pain altered Tx:  [x] No  [] Yes  Action:  Comments: Pt with no pain today but was very achy in wrist yesterday. Uses heat for pain relief.     Objective:  Modalities:   Precautions:  Exercises:   Reps/ Time Weight/ Level Comments   Stretch wrist flex, ext 10x10s       Stretch wrist pron/sup 10x10s       Stretch PIP,MP,DIP flex 10x10s       Stretch PIP,MP,DIP ext 10x10s     AROM fingers: fist 10x     opposition 10x     Thumb flex/abd 10x     Towel squeeze 5\" x10     AROM wrist: all dir 10x     Manual wrist PROM 10m  All directions   Other:      Treatment Charges: Mins Units   []  Modalities     [x]  Ther Exercise 30 2   [x]  Manual Therapy 10 1   []  Ther Activities     []  Aquatics     []  Vasocompression     []  Other     Total Treatment time 40 3       Assessment: [x] Progressing toward goals. Cont with ex with arm resting on table. VC for proper tech with ex and to relax R shoulder during PROM. Added AROM ex for wrist and hand along with gentle gripping ex. Pt with good sally and no pain noted. Manual wrist stretching and PROM with slight pain during wrist ext. Will cont with ex and pt to go OOT next week. Will review HEP. [] No change. [] Other:  [x] Patient would continue to benefit from skilled physical therapy services in order to: Pt limited by R wrist, hand pain, ROM loss, hand, wrist, elbow weakness w/ limitations in functional activties S/P ORIF R distal radius fx. Will initiate wrist, hand passive stretching, ROM exercises    SHORT TERM GOALS ( 8 visits)  Wrist/hand pain = 0  Wrist/hand ROM = WNL  Elbow, wrist, hand strength = 4+/5  Wrist/hand function: , lift, twist w/o pain     LONG TERM GOALS ( 12 visits)  Independent Home Exercise program  Return to normal activity     PATIENT GOAL  Better use of hand    Pt. Education:  [x] Yes  [] No  [x] Reviewed Prior HEP/Ed  Method of Education: [x] Verbal  [x] Demo  [] Written  Comprehension of Education:  [x] Verbalizes understanding. [x] Demonstrates understanding. [] Needs review. [x] Demonstrates/verbalizes HEP/Ed previously given. Plan: [x] Continue current frequency toward long and short term goals.     [x] Specific Instructions for subsequent treatments: cont ROM ex      Time In:10:00am            Time Out: 10:40am    Electronically signed by:  Nathaniel Perez PTA

## 2021-05-10 ENCOUNTER — HOSPITAL ENCOUNTER (OUTPATIENT)
Dept: PHYSICAL THERAPY | Facility: CLINIC | Age: 71
Setting detail: THERAPIES SERIES
Discharge: HOME | End: 2021-05-10
Payer: MEDICARE

## 2021-05-10 PROCEDURE — 97110 THERAPEUTIC EXERCISES: CPT

## 2021-05-10 PROCEDURE — 97140 MANUAL THERAPY 1/> REGIONS: CPT

## 2021-05-10 NOTE — FLOWSHEET NOTE
[] [facility]  [address]  [phone]  F: [phone] [] [address]  [facility] 36   [address]  P: [phone]  F: [phone] [x] [address]  The[facility]  [address] [facility]  P: [phone]  F: [phone] [] [address]  P: [phone]  F: [phone] [] [address]  [facility]   [address]   [place]: [phone]  F: [phone]      Physical Therapy Daily Treatment Note    Date:  5/10/2021  Patient Name:  Jose Daniel Roach    :  1950  MRN: [id number]  Physician:  [address]: MEDICARE              Eligibility Status: [facility]  Secondary Insurance (if applicable) [employer]              Eligibility Status: Eligible      DOS: 5.3.21  # of visits allowed/remaining: Based on medical necessity  Source: Website  Spoke To:  [employer]  Reference: [id number]  Medical Diagnosis: S/P ORIF R distal radius fx                    Rehab Codes: S52.501A  Onset Date: 3/3021          Next Dr. Loyd Shadow:   Visit# / total visits: 3/12; Progress note for Medicare patient due at visit 10     Cancels/No Shows:     Subjective:    Pain:  [x] Yes  [x] No Location: R wrist Pain Rating: (0-10 scale) 1/10  Pain altered Tx:  [x] No  [] Yes  Action:  Comments: Feels stiff today, thinks it may be from the weather. Leaves for vacation tomorrow, will be gone for over a week.      Objective:  Modalities: MHP to R wrist post ex  Precautions:  Exercises:   Reps/ Time Weight/ Level Comments   Stretch wrist flex, ext 10x10s       Stretch wrist pron/sup 10x10s       Stretch PIP,MP,DIP flex 10x10s       Stretch PIP,MP,DIP ext 10x10s     AROM fingers: fist 10x     Finger Abd/Add 10x     opposition 10x Thumb flex/abd 10x     Towel squeeze 5\" x10     AROM wrist: all dir 10x     Wrist Circles      Manual wrist PROM 10m  All directions   Other:      Treatment Charges: Mins Units   [x]  Modalities - MHP 8 nc   [x]  Ther Exercise 30 2   [x]  Manual Therapy 10 1   []  Ther Activities     []  Aquatics     []  Vasocompression     []  Other     Total Treatment time 48 3       Assessment: [x] Progressing toward goals. Cont with ex per log above with focus on improving ROM. Added wrist circles and finger abd/add. Needs cues to avoid compensatory movements with the shoulder. Issued HEP of all ex to be completed when pt is away on vacation. Ended with MHP to R wrist for pain relief. [] No change. [] Other:  [x] Patient would continue to benefit from skilled physical therapy services in order to: Pt limited by R wrist, hand pain, ROM loss, hand, wrist, elbow weakness w/ limitations in functional activties S/P ORIF R distal radius fx. Will initiate wrist, hand passive stretching, ROM exercises    SHORT TERM GOALS ( 8 visits)  Wrist/hand pain = 0  Wrist/hand ROM = WNL  Elbow, wrist, hand strength = 4+/5  Wrist/hand function: , lift, twist w/o pain     LONG TERM GOALS ( 12 visits)  Independent Home Exercise program  Return to normal activity     PATIENT GOAL  Better use of hand    Pt. Education:  [x] Yes  [] No  [x] Reviewed Prior HEP/Ed  Method of Education: [x] Verbal  [x] Demo  [] Written  Comprehension of Education:  [x] Verbalizes understanding. [x] Demonstrates understanding. [] Needs review. [x] Demonstrates/verbalizes HEP/Ed previously given. Plan: [x] Continue current frequency toward long and short term goals.     [x] Specific Instructions for subsequent treatments: cont ROM ex      Time In: 3:05 pm            Time Out: 3:57 pm    Electronically signed by:  Angelica Cano PTA

## 2021-05-10 NOTE — PRE-CERTIFICATION NOTE
Medicare Cap   [] Physical Therapy  [] Speech Therapy  [] Occupational therapy  *PT and Speech caps combine      $2100 Limit for PT and Speech combined  $2100 Limit for OT individually  At the beginning of the month where you expect to go over $2100, please add the [address] [place] [address] modifier      Patient Name: Kelsie Cleary: 1950    Note:  This is an estimate of charges billed.      Date of [address] Name # units/ charge $$$ charge Daily Total Charge Ongoing Total $$$   5/3/21 Eval+ther ex  82.82+23.22  106.04   5/7/21 Ther ex 2+man  25.26+19.74+18.45 63.45 169.49   5/10/21 therex2+man   63.45 232.94

## 2021-05-21 ENCOUNTER — HOSPITAL ENCOUNTER (OUTPATIENT)
Dept: PHYSICAL THERAPY | Facility: CLINIC | Age: 71
Setting detail: THERAPIES SERIES
Discharge: HOME | End: 2021-05-21
Payer: MEDICARE

## 2021-05-21 PROCEDURE — 97110 THERAPEUTIC EXERCISES: CPT

## 2021-05-21 PROCEDURE — 97140 MANUAL THERAPY 1/> REGIONS: CPT

## 2021-05-25 ENCOUNTER — HOSPITAL ENCOUNTER (OUTPATIENT)
Dept: PHYSICAL THERAPY | Facility: CLINIC | Age: 71
Setting detail: THERAPIES SERIES
Discharge: HOME | End: 2021-05-25
Payer: MEDICARE

## 2021-05-25 PROCEDURE — 97110 THERAPEUTIC EXERCISES: CPT

## 2021-05-25 NOTE — FLOWSHEET NOTE
[] [facility]  [address]  [phone]  F: [phone] [] [address]  [employer] 36   [address]  P: [phone]  F: [phone] [x] [address]  The[facility]  [address]  P: [phone]  F: [phone] [] [address]  P: [phone]  F: [phone] [] [address]  [facility]   [address]   [place]: [phone]  F: [phone]      Physical Therapy Daily Treatment Note    Date:  2021  Patient Name:  Inder De Leon    :  1950  MRN: [id number]  Physician:  [address]: MEDICARE              Eligibility Status:  Eligible     Secondary Insurance (if applicable) [employer]              Eligibility Status: Eligible      DOS: 5.3.21  # of visits allowed/remaining: Based on medical necessity  Source: Website  Spoke To:  [employer]  Reference: [id number]  Medical Diagnosis: S/P ORIF R distal radius fx                    Rehab Codes: S52.501A  Onset Date: 3/3021          Next Dr. Lewis Arn:   Visit# / total visits: ; Progress note for Medicare patient due at visit 10     Cancels/No Shows:     Subjective:    Pain:  [x] Yes  [] No Location: R wrist Pain Rating: (0-10 scale) \"low\"/10  Pain altered Tx:  [x] No  [] Yes  Action:  Comments: Pt reports she felt good following manual at last session. States she has been using her hand/wrist more, was able to use a fork in that hand for the first time since her injury.      Objective:  Modalities: MHP to R wrist post ex - not today  Precautions:  Exercises:   Reps/ Time Weight/ Level Comments   Stretch wrist flex, ext 10x10s       Stretch wrist pron/sup 10x10s       Stretch PIP,MP,DIP flex 10x10s       Stretch PIP,MP,DIP ext 10x10s     AROM fingers: fist 15x     Finger Abd/Add 15x     opposition 15x     Thumb flex/abd 15x     Towel squeeze 5\" x15     AROM wrist: all dir 15x     Pronation/Supination 15x     Wrist Circles - fwd/back 15x     Clothespin Squeeze 10x ea Yellow     Foam Squeeze 15x3\" Blue    Manual wrist PROM 10m  All directions   Other:      Treatment Charges: Mins Units   []  Modalities - MHP     [x]  Ther Exercise 40 3   [x]  Manual Therapy 7 nc   []  Ther Activities     []  Aquatics     []  Vasocompression     []  Other     Total Treatment time 47 3       Assessment: [x] Progressing toward goals. Cont treatment with focus on improving ROM. Addition of foam squeeze and clothespin squeezes to work on  strengthening. Cont to end with PROM. Emphasized importance of daily HEP and using her hand as much as possible. [] No change. [] Other:  [x] Patient would continue to benefit from skilled physical therapy services in order to: Pt limited by R wrist, hand pain, ROM loss, hand, wrist, elbow weakness w/ limitations in functional activties S/P ORIF R distal radius fx. Will initiate wrist, hand passive stretching, ROM exercises    SHORT TERM GOALS ( 8 visits)  Wrist/hand pain = 0  Wrist/hand ROM = WNL  Elbow, wrist, hand strength = 4+/5  Wrist/hand function: , lift, twist w/o pain     LONG TERM GOALS ( 12 visits)  Independent Home Exercise program  Return to normal activity     PATIENT GOAL  Better use of hand    Pt. Education:  [x] Yes  [] No  [x] Reviewed Prior HEP/Ed  Method of Education: [x] Verbal  [x] Demo  [] Written  Comprehension of Education:  [x] Verbalizes understanding. [x] Demonstrates understanding. [] Needs review. [x] Demonstrates/verbalizes HEP/Ed previously given. Plan: [x] Continue current frequency toward long and short term goals.     [x] Specific Instructions for subsequent treatments: cont ROM ex      Time In: 1:00 pm           Time Out: 1:50 pm    Electronically signed by:  Pooja [place] Fear, PTA

## 2021-05-25 NOTE — PRE-CERTIFICATION NOTE
Medicare Cap   [] Physical Therapy  [] Speech Therapy  [] Occupational therapy  *PT and Speech caps combine      $2100 Limit for PT and Speech combined  $2100 Limit for OT individually  At the beginning of the month where you expect to go over $2100, please add the [address] [place] [address] modifier      Patient Name: John Poole: 1950    Note:  This is an estimate of charges billed.      Date of [address] Name # units/ charge $$$ charge Daily Total Charge Ongoing Total $$$   5/3/21 Eval+ther ex  82.82+23.22  106.04   5/7/21 Ther ex 2+man  25.26+19.74+18.45 63.45 169.49   5/10/21 therex2+man   63.45 232.94   5/21 therex2+man   63.45 296.39   5/25 therex3  25.26+19.74+19.74 64.74 361.13

## 2021-05-26 ENCOUNTER — OFFICE VISIT (OUTPATIENT)
Dept: ORTHOPEDIC SURGERY | Age: 71
End: 2021-05-26

## 2021-05-26 VITALS — HEIGHT: 66 IN | BODY MASS INDEX: 17.52 KG/M2 | WEIGHT: 109 LBS | TEMPERATURE: 97 F

## 2021-05-26 DIAGNOSIS — S52.501D CLOSED FRACTURE OF DISTAL END OF RIGHT RADIUS WITH ROUTINE HEALING, UNSPECIFIED FRACTURE MORPHOLOGY, SUBSEQUENT ENCOUNTER: Primary | ICD-10-CM

## 2021-05-26 PROCEDURE — 99024 POSTOP FOLLOW-UP VISIT: CPT | Performed by: ORTHOPAEDIC SURGERY

## 2021-05-26 ASSESSMENT — ENCOUNTER SYMPTOMS
VOMITING: 0
CHEST TIGHTNESS: 0
APNEA: 0
COUGH: 0
ABDOMINAL PAIN: 0

## 2021-05-26 NOTE — PROGRESS NOTES
and sitting comfortably in our office. Ortho Exam  MS:   Patient is 1 cm away from full fist on the right. F=15 right wrist. E=5 right wrist.  S=30 right wrist.  P=80 right wrist.  Motor, sensory, vascular examination to the right upper extremity is grossly intact without focal deficits. Neuro: alert. oriented  Eyes: Extra-ocular muscles intact  Mouth: Oral mucosa moist. No perioral lesions  Pulm: Respirations unlabored and regular. Skin: warm, well perfused  Psych:   Patient has good fund of knowledge and displays understanging of exam, diagnosis, and plan. Radiology:     XR WRIST RIGHT (MIN 3 VIEWS)    Result Date: 5/26/2021  History: Right distal radius fracture status post open reduction and internal fixation Findings: AP, lateral, oblique x-rays of the right wrist done in the office today shows distal radius fracture healing in anatomic position. Hardware is in good position without complications or signs of loosening. No change in alignment of the fracture is appreciated when compared to previous x-rays of April 14, 2021. No further evidence of fracture, subluxation, dislocation, radiopaque foreign body, radiopaque tumors noted. Impression: Right distal radius fracture healing with hardware in good position as described above. Assessment:      1. Closed fracture of distal end of right radius with routine healing, unspecified fracture morphology, subsequent encounter         Plan:      Discussed with the patient that I would like her to continue to go formal hand therapy. Discussed with her that she could see some loss in supination and extension of the right wrist. Patient voices understanding. The patient is okay to complete activities as tolerates but should avoid heavy lifting of the right wrist. Will have the patient come back into the office in 4 weeks to evaluate range of motion of the right wrist. The patient knows to call with any questions or concerns.         Follow up: Return in about 4 weeks (around 6/23/2021). No orders of the defined types were placed in this encounter. Orders Placed This Encounter   Procedures    XR WRIST RIGHT (MIN 3 VIEWS)     Standing Status:   Future     Number of Occurrences:   1     Standing Expiration Date:   5/26/2022     IAyesha LPN am scribing for and in the presence of Dr. Roxy Nageotte  5/26/2021 10:26 PM      I have reviewed and made changes accordingly to the work scribed by Ayesha Soares LPN. The documentation accurately reflects work and decisions made by me. I have also reviewed documentation completed by clinical staff.     Roxy Nageotte, DO, [facility]  5/26/2021 10:27 PM    This note is created with the assistance of a speech recognition program.  While intending to generate a document that actually reflects the content of the visit, the document can still have some errors including those of syntax and sound a like substitutions which may escape proof reading.  In such instances, actual meaning can be extrapolated by contextual diversion      Electronically signed by Chata Pelaez on 5/26/2021 at 10:26 PM

## 2021-05-28 ENCOUNTER — HOSPITAL ENCOUNTER (OUTPATIENT)
Dept: PHYSICAL THERAPY | Facility: CLINIC | Age: 71
Setting detail: THERAPIES SERIES
Discharge: HOME | End: 2021-05-28
Payer: MEDICARE

## 2021-05-28 PROCEDURE — 97140 MANUAL THERAPY 1/> REGIONS: CPT

## 2021-05-28 PROCEDURE — 97110 THERAPEUTIC EXERCISES: CPT

## 2021-05-28 NOTE — PRE-CERTIFICATION NOTE
Medicare Cap   [x] Physical Therapy  [] Speech Therapy  [] Occupational therapy  *PT and Speech caps combine      $2100 Limit for PT and Speech combined  $2100 Limit for OT individually  At the beginning of the month where you expect to go over $2100, please add the [address] [place] modifier      Patient Name: Ga Alonso: 1950    Note:  This is an estimate of charges billed.      Date of [address] Name # units/ charge $$$ charge Daily Total Charge Ongoing Total $$$   5/3/21 Eval+ther ex  82.82+23.22  106.04   5/7/21 Ther ex 2+man  25.26+19.74+18.45 63.45 169.49   5/10/21 therex2+man   63.45 232.94   5/21 therex2+man   63.45 296.39   5/25 therex3  25.26+19.74+19.74 64.74 361.13   5/28 therex2+man   63.45 424.58

## 2021-05-28 NOTE — FLOWSHEET NOTE
previously given. Plan: [x] Continue current frequency toward long and short term goals.     [x] Specific Instructions for subsequent treatments: cont ROM ex      Time In: 10:58 am          Time Out: 11:52 am    Electronically signed by:  Sharon Mederos PTA

## 2021-06-02 ENCOUNTER — HOSPITAL ENCOUNTER (OUTPATIENT)
Dept: PHYSICAL THERAPY | Facility: CLINIC | Age: 71
Setting detail: THERAPIES SERIES
Discharge: HOME | End: 2021-06-02
Payer: MEDICARE

## 2021-06-02 PROCEDURE — 97140 MANUAL THERAPY 1/> REGIONS: CPT

## 2021-06-02 PROCEDURE — 97110 THERAPEUTIC EXERCISES: CPT

## 2021-06-02 NOTE — FLOWSHEET NOTE
[] [facility] [facility]  [address]  [phone]  F: [phone] [] [address]  [facility] 36   [address]  P: [phone]  F: [phone] [x] [address]  Therapy  [facility]  P: [phone]  F: [phone] [] [address]  P: [phone]  F: [phone] [] [address]  [facility]   [address]   [place]: [phone]  F: [phone]      Physical Therapy Daily Treatment Note    Date:  2021  Patient Name:  Jaskaran Will    :  1950  MRN: [id number]  Physician:  Yadira [address]: MEDICARE              Eligibility Status:  Eligible     Secondary Insurance (if applicable) [employer]              Eligibility Status: Eligible      DOS: 5.3.21  # of visits allowed/remaining: Based on medical necessity  Source: Website  Spoke To:  [employer]  Reference: [id number]  Medical Diagnosis: S/P ORIF R distal radius fx                    Rehab Codes: S52.501A  Onset Date: 3/3021          Next Dr. Yared Abdul:   Visit# / total visits: ; Progress note for Medicare patient due at visit 10     Cancels/No Shows:     Subjective:    Pain:  [x] Yes  [] No Location: R wrist Pain Rating: (0-10 scale) 1-2/10  Pain altered Tx:  [x] No  [] Yes  Action:  Comments: Pt arrives after painting this morning with slight pain and moderate stiffness d/t \"weather changes\".      Objective:  Modalities: MHP to R wrist post ex - not today  Precautions:  Exercises:   Reps/ Time Weight/ Level Comments   Stretch wrist flex, ext 10x10s       Stretch wrist pron/sup 10x10s       Stretch PIP,MP,DIP flex 10x10s       Stretch PIP,MP,DIP ext 10x10s     AROM fingers: fist 15x     Finger Abd/Add 15x     opposition 15x Thumb flex/abd 15x     Towel squeeze 5\" x15     AROM wrist: all dir 15x     Pronation/Supination 15x Black Wand    Wrist Circles - fwd/back 15x     Clothespin Squeeze 10x ea Yellow     Foam Squeeze 15x3\" Blue    Manual wrist PROM 10m  All directions   Other:  AROM flexion: 33 degrees (was 30)  AROM extension: 25 degrees (was 5)    Treatment Charges: Mins Units   []  Modalities - MHP     [x]  Ther Exercise 35 2   [x]  Manual Therapy 10 1   []  Ther Activities     []  Aquatics     []  Vasocompression     []  Other     Total Treatment time 45 3       Assessment: [x] Progressing toward goals. Emphasis directed to increasing ROM to enhance wrist movement and functional capabilities. Applied MHP to R hand in attempt to promote flexibility d/t ex tightness with good relief noted and pt able to increase ROM. Did not progress this date d/t symptoms and focused on increasing range passively and actively. Pt claims she is compliant to HEP and is trying to move her wrist and digits frequently throughout the day. Attempted to modify radial deviation techniques to eliminate forearm compensations with fair tolerance. Will continue to monitor pt response and progress as tolerated. [] No change. [] Other:  [x] Patient would continue to benefit from skilled physical therapy services in order to: Pt limited by R wrist, hand pain, ROM loss, hand, wrist, elbow weakness w/ limitations in functional activties S/P ORIF R distal radius fx. Will initiate wrist, hand passive stretching, ROM exercises    SHORT TERM GOALS ( 8 visits)  Wrist/hand pain = 0  Wrist/hand ROM = WNL  Elbow, wrist, hand strength = 4+/5  Wrist/hand function: , lift, twist w/o pain     LONG TERM GOALS ( 12 visits)  Independent Home Exercise program  Return to normal activity     PATIENT GOAL  Better use of hand    Pt.  Education:  [x] Yes  [] No  [x] Reviewed Prior HEP/Ed  Method of Education: [x] Verbal  [x] Demo  [] Written  Comprehension of Education:  [x] Verbalizes understanding. [x] Demonstrates understanding. [] Needs review. [x] Demonstrates/verbalizes HEP/Ed previously given. Plan: [x] Continue current frequency toward long and short term goals. [x] Specific Instructions for subsequent treatments: cont ROM ex      Time In: 2:32 pm          Time Out: 3:23 pm    Electronically signed by:   Inga Plata, [place]

## 2021-06-02 NOTE — PRE-CERTIFICATION NOTE
Medicare Cap   [x] Physical Therapy  [] Speech Therapy  [] Occupational therapy  *PT and Speech caps combine      $2100 Limit for PT and Speech combined  $2100 Limit for OT individually  At the beginning of the month where you expect to go over $2100, please add the [address] [place] modifier      Patient Name: Wellington Payor: 1950    Note:  This is an estimate of charges billed.      Date of [address] Name # units/ charge $$$ charge Daily Total Charge Ongoing Total $$$   5/3/21 Eval+ther ex  82.82+23.22  106.04   5/7/21 Ther ex 2+man  25.26+19.74+18.45 63.45 169.49   5/10/21 therex2+man   63.45 232.94   5/21 therex2+man   63.45 296.39   5/25 therex3  25.26+19.74+19.74 64.74 361.13   5/28 therex2+man   63.45 424.58   6/2 therex2 + man   63.45 488.03

## 2021-06-04 ENCOUNTER — HOSPITAL ENCOUNTER (OUTPATIENT)
Dept: PHYSICAL THERAPY | Facility: CLINIC | Age: 71
Setting detail: THERAPIES SERIES
Discharge: HOME | End: 2021-06-04
Payer: MEDICARE

## 2021-06-04 PROCEDURE — 97140 MANUAL THERAPY 1/> REGIONS: CPT

## 2021-06-04 PROCEDURE — 97110 THERAPEUTIC EXERCISES: CPT

## 2021-06-04 NOTE — FLOWSHEET NOTE
[] [facility]  [address]  [phone]  F: [phone] [] [address]  [facility] 36   [address]  P: [phone]  F: [phone] [x] [address]  Therapy  [facility]  P: [phone]  F: [phone] [] [address]  P: [phone]  F: [phone] [] [address]  [facility]   [address]   [place]: [phone]  F: [phone]      Physical Therapy Daily Treatment Note    Date:  2021  Patient Name:  Nancy Elizalde    :  1950  MRN: [id number]  Physician:  Yadira [address]: MEDICARE              Eligibility Status:  Eligible     Secondary Insurance (if applicable) [employer]              Eligibility Status: Eligible      DOS: 5.3.21  # of visits allowed/remaining: Based on medical necessity  Source: Website  Spoke To:  [employer]  Reference: [id number]  Medical Diagnosis: S/P ORIF R distal radius fx                    Rehab Codes: S52.501A  Onset Date: 3/3021          Next Dr. Jaz Hayden:   Visit# / total visits: ; Progress note for Medicare patient due at visit 10     Cancels/No Shows:     Subjective:    Pain:  [x] Yes  [] No Location: R wrist Pain Rating: (0-10 scale) 1-2/10  Pain altered Tx:  [x] No  [] Yes  Action:  Comments: Cont to have soreness/stiffness in her wrist. Reports HEP compliance.      Objective:  Modalities: MHP to R wrist post ex - not today  Precautions:  Exercises:   Reps/ Time Weight/ Level Comments   Stretch wrist flex, ext 10x10s       Stretch wrist pron/sup 10x10s       Stretch PIP,MP,DIP flex 10x10s       Stretch PIP,MP,DIP ext 10x10s     AROM fingers: fist 15x     Finger Abd/Add 15x     opposition 15x     Thumb flex/abd 15x     Towel squeeze 5\" x15     AROM wrist: all dir 15x     Pronation/Supination 15x Black Wand    Wrist Circles - fwd/back 15x     Clothespin Squeeze 10x ea Yellow     Foam Squeeze 15x3\" Blue    Putty Squeeze  x15     Putty Push x15 ea     Tabletop Weight Shifts      Manual wrist PROM 10m  All directions   Other:    Treatment Charges: Mins Units   []  Modalities - MHP     [x]  Ther Exercise 35 2   [x]  Manual Therapy 10 1   []  Ther Activities     []  Aquatics     []  Vasocompression     []  Other     Total Treatment time 45 3       Assessment: [x] Progressing toward goals. Cont with ex per log above with focus on improving ROM and  strength. Added putty push and squeeze, issued orange putty for her HEP. Also added standing tabletop weight shifts with fair tolerance, increased pain level on 10th rep. Cont to end with PROM in all planes. [] No change. [] Other:  [x] Patient would continue to benefit from skilled physical therapy services in order to: Pt limited by R wrist, hand pain, ROM loss, hand, wrist, elbow weakness w/ limitations in functional activties S/P ORIF R distal radius fx. Will initiate wrist, hand passive stretching, ROM exercises    SHORT TERM GOALS ( 8 visits)  Wrist/hand pain = 0  Wrist/hand ROM = WNL  Elbow, wrist, hand strength = 4+/5  Wrist/hand function: , lift, twist w/o pain     LONG TERM GOALS ( 12 visits)  Independent Home Exercise program  Return to normal activity     PATIENT GOAL  Better use of hand    Pt. Education:  [x] Yes  [] No  [x] Reviewed Prior HEP/Ed  Method of Education: [x] Verbal  [x] Demo  [] Written  Comprehension of Education:  [x] Verbalizes understanding. [x] Demonstrates understanding. [] Needs review. [x] Demonstrates/verbalizes HEP/Ed previously given. Plan: [x] Continue current frequency toward long and short term goals.     [x] Specific Instructions for subsequent treatments: cont ROM ex      Time In: 11:00 am          Time Out: 11:55 am    Electronically

## 2021-06-07 ENCOUNTER — HOSPITAL ENCOUNTER (OUTPATIENT)
Dept: PHYSICAL THERAPY | Facility: CLINIC | Age: 71
Setting detail: THERAPIES SERIES
Discharge: HOME | End: 2021-06-07
Payer: MEDICARE

## 2021-06-07 PROCEDURE — 97140 MANUAL THERAPY 1/> REGIONS: CPT

## 2021-06-07 PROCEDURE — 97110 THERAPEUTIC EXERCISES: CPT

## 2021-06-07 NOTE — FLOWSHEET NOTE
[] [facility] [facility]  [address]  [phone]  F: [phone] [] [address]  [facility] 36   [address]  P: [phone]  F: [phone] [x] [address]  Therapy  [address] [facility]  P: [phone]  F: [phone] [] [address]  P: [phone]  F: [phone] [] [address]  [facility]   [address]   [place]: [phone]  F: [phone]      Physical Therapy Daily Treatment Note    Date:  2021  Patient Name:  Jose Daniel Roach    :  1950  MRN: [id number]  Physician:  Yadira [address]: MEDICARE              Eligibility Status:  Eligible     Secondary Insurance (if applicable) [employer]              Eligibility Status: Eligible      DOS: 5.3.21  # of visits allowed/remaining: Based on medical necessity  Source: Website  Spoke To:  [employer]  Reference: [id number]  Medical Diagnosis: S/P ORIF R distal radius fx                    Rehab Codes: S52.501A  Onset Date: 3/3021          Next Dr. Loyd Shadow:   Visit# / total visits: ; Progress note for Medicare patient due at visit 10     Cancels/No Shows:      Subjective:    Pain:  [x] Yes  [] No Location: R wrist Pain Rating: (0-10 scale) 1-2/10  Pain altered Tx:  [x] No  [] Yes  Action:  Comments: Pt reports she was able to cut her steak for dinner last night.  Pt continues to have soreness and limited ROM in the wrist.      Objective:  Modalities: MHP to R wrist post ex - not today  Precautions:  Exercises:   Reps/ Time Weight/ Level Completed Comments   Stretch wrist flex, ext 10x10s   x     Stretch wrist pron/sup 10x10s   x     Stretch PIP,MP,DIP flex 10x10s   x     Stretch PIP,MP,DIP ext 10x10s  x    AROM fingers: fist 15x of Education: [x] Verbal  [x] Demo  [] Written  Comprehension of Education:  [x] Verbalizes understanding. [x] Demonstrates understanding. [] Needs review. [x] Demonstrates/verbalizes HEP/Ed previously given. Plan: [x] Continue current frequency toward long and short term goals.     [x] Specific Instructions for subsequent treatments: cont ROM ex      Time In: 2:55 pm          Time Out: 3:48 pm     Electronically signed by:  Claudean Irwin, PTA

## 2021-06-11 ENCOUNTER — HOSPITAL ENCOUNTER (OUTPATIENT)
Dept: PHYSICAL THERAPY | Facility: CLINIC | Age: 71
Setting detail: THERAPIES SERIES
Discharge: HOME | End: 2021-06-11
Payer: MEDICARE

## 2021-06-11 PROCEDURE — 97110 THERAPEUTIC EXERCISES: CPT

## 2021-06-11 PROCEDURE — 97140 MANUAL THERAPY 1/> REGIONS: CPT

## 2021-06-11 NOTE — FLOWSHEET NOTE
opposition 15x  x    Thumb flex/abd 15x  x    Towel squeeze 5\" x15  x    AROM wrist: all dir 15x  x    Pronation/Supination 15x Black Wand x    Pronation/Supination DB 10xea 1#     Flex, ext DB 2x10 each 1# x    Wrist maze x3  x    Finger web flex/ext 2x10ea      Wrist Circles - fwd/back 15x  x    Digiflex X10 yellow x    Clothespin Squeeze 10x ea Yellow x    Foam Squeeze 15x3\" Blue     Putty Squeeze  x15      Putty Push x15 ea      Tabletop Weight Shifts 20x  x    Manual wrist PROM 10m  x All directions   Other:    Treatment Charges: Mins Units   []  Modalities - MHP     [x]  Ther Exercise 35 2   [x]  Manual Therapy 15 1   []  Ther Activities     []  Aquatics     []  Vasocompression     []  Other     Total Treatment time 50 3       Assessment: [x] Progressing toward goals. Initiated session on UBE to work on  strength. Added digiflex with good tolerance. Still has discomfort in the thumb with most ex. Emphasized importance of stretching frequently throughout the day. Issued lime putty for HEP. Scheduled for re-check with primary therapist at next visit. [] No change. [] Other:  [x] Patient would continue to benefit from skilled physical therapy services in order to: Pt limited by R wrist, hand pain, ROM loss, hand, wrist, elbow weakness w/ limitations in functional activties S/P ORIF R distal radius fx. Will initiate wrist, hand passive stretching, ROM exercises    SHORT TERM GOALS ( 8 visits)  Wrist/hand pain = 0  Wrist/hand ROM = WNL  Elbow, wrist, hand strength = 4+/5  Wrist/hand function: , lift, twist w/o pain     LONG TERM GOALS ( 12 visits)  Independent Home Exercise program  Return to normal activity     PATIENT GOAL  Better use of hand    Pt. Education:  [x] Yes  [] No  [x] Reviewed Prior HEP/Ed  Method of Education: [x] Verbal  [x] Demo  [] Written  Comprehension of Education:  [x] Verbalizes understanding. [x] Demonstrates understanding. [] Needs review.   [x] Demonstrates/verbalizes HEP/Ed previously given. Plan: [x] Continue current frequency toward long and short term goals.     [x] Specific Instructions for subsequent treatments: cont ROM ex      Time In: 2:00 pm          Time Out: 2:50 pm     Electronically signed by:  Ana Hurtado PTA

## 2021-06-11 NOTE — PRE-CERTIFICATION NOTE
Medicare Cap   [x] Physical Therapy  [] Speech Therapy  [] Occupational therapy  *PT and Speech caps combine      $2100 Limit for PT and Speech combined  $2100 Limit for OT individually  At the beginning of the month where you expect to go over $2100, please add the [address] [place] 22 modifier      Patient Name: John Poole: 1950    Note:  This is an estimate of charges billed.      Date of [address] Name # units/ charge $$$ charge Daily Total Charge Ongoing Total $$$   5/3/21 Eval+ther ex  82.82+23.22  106.04   5/7/21 Ther ex 2+man  25.26+19.74+18.45 63.45 169.49   5/10/21 therex2+man   63.45 232.94   5/21 therex2+man   63.45 296.39   5/25 therex3  25.26+19.74+19.74 64.74 361.13   5/28 therex2+man   63.45 424.58   6/2 therex2 + man   63.45 488.03   6/4 therex2+man   63.45 551.48   6/7 3 therex+man  25.26+19.74+19.74+18.45 83.19 634.67   6/11 therex2+man   63.45 698.12

## 2021-06-14 ENCOUNTER — HOSPITAL ENCOUNTER (OUTPATIENT)
Dept: PHYSICAL THERAPY | Facility: CLINIC | Age: 71
Setting detail: THERAPIES SERIES
Discharge: HOME | End: 2021-06-14
Payer: MEDICARE

## 2021-06-14 PROCEDURE — 97110 THERAPEUTIC EXERCISES: CPT

## 2021-06-14 PROCEDURE — 97140 MANUAL THERAPY 1/> REGIONS: CPT

## 2021-06-14 NOTE — FLOWSHEET NOTE
[employer] [address] [place] [address]  [address], [facility] 36.  Phone: [phone]  Fax:     [phone]    Physical Therapy Progress Report    Date:  2021  Patient: Jane Pastor  : 1950  MRN: [id number]  Physician: [facility]: MEDICARE              Eligibility Status:  Eligible     Secondary Insurance (if applicable) MEDICAL M[employer]              Eligibility Status: Eligible      DOS: 5.3.21  # of visits allowed/remaining: Based on medical necessity  Source: Website  Spoke To:  [employer]  Reference: [id number]  Medical Diagnosis: S/P ORIF R distal radius fx    Rehab Codes: S52.501A  Onset Date: 3/3021                                   Subjective:  Pt report cont pain, stiffness of R wrist, hand regions, but less than previous, cont difficulty w/ bending, gripping activities.  Pt feels she is making progress, just not as fast as she would like    Pain:  [x] Yes  [] No Pain Rating: (0-10 scale) 3/10    Objective:   L/R   ROM  ° A/P STRENGTH    Elbow Flex WNL WNL 4+ 4    Ext WNL WNL 4+ 4    Pron WNL 80 4+ 4-    Sup WNL 60 4+ 4-    Wrist flex WNL 70 4+ 4-    ext WNL 35 4+ 4    1st PIP flex WNL 0-60 4+ 3+    DIP flex WNL 0-30 4+ 3+    2nd PIP flex WNL 0-90 4+ 3+    MP flex WNL 0-100 4+ 3+    DIP flex WNL 0-40 4+ 3+    3rd PIP flex WNL 0-90 4+ 3+    MP flex WNL 0-105 4+ 3+    DIP dionisio WNL 0-30 4+ 3+    4th PIP flex WNL 0-100 4+ 3+    MP flex WNL 0-120 4+ 3+    DIP flex WNL 0-30 4+ 3+    5th PIP flex WNL 0-100 4+ 3+    MP flex WNL 0-115 4+ 3+    DIP flex WNL 0-25 4+ 3+      Todays Treatment:    Exercises:    Reps/ Time Weight/ Level Comments   UBE  10min       Joint Mobilization All planes All planes    PROM R wrist  All planes     Stretch wrist flex, ext 10x10s       Stretch wrist pron/sup 10x10s       Stretch PIP,MP,DIP flex 10x10s       Stretch PIP,MP,DIP ext 10x10s           Specific Instructions for next treatment:    Treatment Charges: Mins Units   [] Modalities     [x]  Ther Exercise 25 2   [x]  Manual Therapy 20 1   []  Ther Activities     []  Aquatics     []  Other         ASSESSMENT   Pt cont limited by ROM loss, weakness of R hand, wrist which limits gripping, twisting, lifting activities. Pt demonstrates improved motion, but would benefit from cont PT for wrist/hand stretching, strengthening exercises    PROBLEMS  Wrist/hand pain  Wrist/hand ROM loss  Elbow, wrist, hand  weakness  Wrist/hand functional deficits    SHORT TERM GOALS ( 8 visits)  Wrist/hand pain = 0  Wrist/hand ROM = WNL  Elbow, wrist, hand strength = 4+/5  Wrist/hand function: , lift, twist w/o pain    LONG TERM GOALS ( 12 visits)  Independent Home Exercise program  Return to normal activity    Rehab Potential:  [x] Good  [] Fair  [] Poor   Suggested Professional Referral:  [x] No  [] Yes:  Barriers to Goal Achievement[de-identified]  [x] No  [] Yes:  Domestic Concerns:  [x] No  [] Yes:    Pt. Education:  [x] Plans/Goals, Risks/Benefits discussed  [x] Home exercise program  Method of Education: [x] Verbal  [x] Demo  [x] Written  Comprehension of Education:  [x] Verbalizes understanding. [x] Demonstrates understanding. [] Needs Review. [] Demonstrates/verbalizes understanding of HEP/Ed previously given.     Treatment Plan:  [x] Therapeutic Exercise    [] Modalities:  [] Therapeutic Activity    [] Ultrasound  [] Electrical Stimulation  [] Gait Training     [x] Massage       [] Lumbar/Cervical Traction  [] Neuromuscular Re-education [x] Cold/hotpack [] Instruction in HEP  [x] Manual Therapy   [] Aquatic Therapy [] Other:     [] Iontophoresis: 4 mg/mL Dexamethasone Sodium Phosphate 40-80 mAmin  [] Drug allergies reviewed    _______Initials           _______Date     Frequency:  2 x/week for 12 visits      Time in: 1500     Time out: 1600    Electronically signed by: Toro Shrestha PT        Physician Signature:________________________________Date:__________________  By signing above, I have reviewed this Scribe Attestation (For Scribes USE Only)... Scribe Attestation (For Scribes USE Only).../Attending Attestation (For Attendings USE Only)...

## 2021-06-18 ENCOUNTER — HOSPITAL ENCOUNTER (OUTPATIENT)
Dept: PHYSICAL THERAPY | Facility: CLINIC | Age: 71
Setting detail: THERAPIES SERIES
Discharge: HOME | End: 2021-06-18
Payer: MEDICARE

## 2021-06-18 PROCEDURE — 97110 THERAPEUTIC EXERCISES: CPT

## 2021-06-18 PROCEDURE — 97140 MANUAL THERAPY 1/> REGIONS: CPT

## 2021-06-18 NOTE — PRE-CERTIFICATION NOTE
Medicare Cap   [x] Physical Therapy  [] Speech Therapy  [] Occupational therapy  *PT and Speech caps combine      $2100 Limit for PT and Speech combined  $2100 Limit for OT individually  At the beginning of the month where you expect to go over $2100, please add the [address] [place] modifier      Patient Name: Erick Charles: 1950    Note:  This is an estimate of charges billed.      Date of [address] Name # units/ charge $$$ charge Daily Total Charge Ongoing Total $$$   5/3/21 Eval+ther ex  82.82+23.22  106.04   5/7/21 Ther ex 2+man  25.26+19.74+18.45 63.45 169.49   5/10/21 therex2+man   63.45 232.94   5/21 therex2+man   63.45 296.39   5/25 therex3  25.26+19.74+19.74 64.74 361.13   5/28 therex2+man   63.45 424.58   6/2 therex2 + man   63.45 488.03   6/4 therex2+man   63.45 551.48   6/7 3 therex+man  25.26+19.74+19.74+18.45 83.19 634.67   6/11 therex2+man   63.45 698.12   6/14 therex2+man  29.21+22.84+21.34 73.39 771.51   6/18 therex2+man  29.21+22.84+21.34 73.39 844.90

## 2021-06-18 NOTE — FLOWSHEET NOTE
[] [facility] [facility]  [address]  [phone]  F: [phone] [] [address]  [facility] 36   [address]  P: [phone]  F: [phone] [x] [address]  Therapy  [facility]  P: [phone]  F: [phone] [] [address]  P: [phone]  F: [phone] [] [address]  [facility]   [address]   [place]: [phone]  F: [phone]      Physical Therapy Daily Treatment Note    Date:  2021  Patient Name:  Amarilys Castro    :  1950  MRN: [id number]  Physician:  Yadira [address]: MEDICARE              Eligibility Status:  Eligible     Secondary Insurance (if applicable) [employer]              Eligibility Status: Eligible      DOS: 5.3.21  # of visits allowed/remaining: Based on medical necessity  Source: Website  Spoke To:  [employer]  Reference: [id number]  Medical Diagnosis: S/P ORIF R distal radius fx                    Rehab Codes: S52.501A  Onset Date: 3/3021          Next Dr. Tse Dec:   Visit# / total visits: , Progress note for Medicare patient due at visit 20     Cancels/No Shows:      Subjective:    Pain:  [] Yes  [x] No Location: R wrist Pain Rating: (0-10 scale) -/10  Pain altered Tx:  [x] No  [] Yes  Action:  Comments: Pt reports feeling more stiffness today from the weather. Has been trying to use her R hand more.      Objective:  Modalities: MHP to R wrist post ex - not today  Precautions:  Exercises:   Reps/ Time Weight/ Level Completed Comments   UBE  4'  x    Stretch wrist flex, ext 10x10s   x     Stretch wrist pron/sup 10x10s   x     Stretch PIP,MP,DIP flex 10x10s   x     Stretch PIP,MP,DIP ext 10x10s  x    AROM fingers: fist 15x  x    Finger

## 2021-06-22 ENCOUNTER — HOSPITAL ENCOUNTER (OUTPATIENT)
Dept: PHYSICAL THERAPY | Facility: CLINIC | Age: 71
Setting detail: THERAPIES SERIES
Discharge: HOME | End: 2021-06-22
Payer: MEDICARE

## 2021-06-22 PROCEDURE — 97110 THERAPEUTIC EXERCISES: CPT

## 2021-06-22 PROCEDURE — 97140 MANUAL THERAPY 1/> REGIONS: CPT

## 2021-06-22 NOTE — FLOWSHEET NOTE
[] [facility] [address]  [address]  [phone]  F: [phone] [] [address]  [facility] 36   [address]  P: [phone]  F: [phone] [x] [address]  Therapy  [facility]  P: [phone]  F: [phone] [] [address]  P: [phone]  F: [phone] [] [address]  [facility]   [address]   [place]: [phone]  F: [phone]      Physical Therapy Daily Treatment Note    Date:  2021  Patient Name:  Deland Dubin    :  1950  MRN: [id number]  Physician:  Maria T Wade: MEDICARE              Eligibility Status:  Eligible     Secondary Insurance (if applicable) [employer]              Eligibility Status: Eligible      DOS: 5.3.21  # of visits allowed/remaining: Based on medical necessity  Source: Website  Spoke To:  [employer]  Reference: [id number]  Medical Diagnosis: S/P ORIF R distal radius fx                    Rehab Codes: S52.501A  Onset Date: 3/3021          Next Dr. Arias :   Visit# / total visits: , Progress note for Medicare patient due at visit 20     Cancels/No Shows:      Subjective:    Pain:  [] Yes  [x] No Location: R wrist Pain Rating: (0-10 scale) -/10  Pain altered Tx:  [x] No  [] Yes  Action:   Comments: Pt reports increased soreness and stiffness today. Has been eating more often with her R UE and has been cutting up her own meat.      Objective:  Modalities: MHP to R wrist post ex - not today  Precautions:  Exercises:   Reps/ Time Weight/ Level Completed Comments   UBE  4'  x    Stretch wrist flex, ext 10x10s   x     Stretch wrist pron/sup 10x10s   x     Stretch PIP,MP,DIP flex 10x10s   x     Stretch PIP,MP,DIP ext 10x10s  x AROM fingers: fist 15x  x    Finger Abd/Add 15x  x    opposition 15x  x    Thumb flex/abd 15x  x    Towel squeeze 5\" x15  x    AROM wrist: all dir 15x  x    Pronation/Supination 15x Black Wand+1 weight x    Pronation/Supination DB 10xea 1#     Flex, ext DB x15 each 1# x    Wrist maze x3  x    Finger web flex/ext 2x10ea      Wrist Circles - fwd/back 15x  x    Digiflex x15 yellow x    Clothespin Squeeze 15x ea Red/Yellow x    Foam Squeeze 15x3\" Blue     Putty Squeeze  x15      Putty Push x15 ea      Tabletop Weight Shifts 20x      Wall Push Up x15  x    Manual wrist PROM 10m  x All directions   Other:    Treatment Charges: Mins Units   []  Modalities - MHP     [x]  Ther Exercise 35 2   [x]  Manual Therapy 15 1   []  Ther Activities     []  Aquatics     []  Vasocompression     []  Other     Total Treatment time 50 3       Assessment: [x] Progressing toward goals. Educated on cross friction massage to scar to break up adhesions. Added weight to wand pronation/supination, cues needed for control. Cont with PROM to wrist and focused on MCP and DIP of thumb joint. [] No change. [] Other:  [x] Patient would continue to benefit from skilled physical therapy services in order to: Pt limited by R wrist, hand pain, ROM loss, hand, wrist, elbow weakness w/ limitations in functional activties S/P ORIF R distal radius fx. Will initiate wrist, hand passive stretching, ROM exercises    SHORT TERM GOALS ( 8 visits)  Wrist/hand pain = 0  Wrist/hand ROM = WNL  Elbow, wrist, hand strength = 4+/5  Wrist/hand function: , lift, twist w/o pain     LONG TERM GOALS ( 12 visits)  Independent Home Exercise program  Return to normal activity     PATIENT GOAL  Better use of hand    Pt. Education:  [x] Yes  [] No  [x] Reviewed Prior HEP/Ed  Method of Education: [x] Verbal  [x] Demo  [] Written  Comprehension of Education:  [x] Verbalizes understanding. [x] Demonstrates understanding. [] Needs review.   [x] Demonstrates/verbalizes HEP/Ed previously given. Plan: [x] Continue current frequency toward long and short term goals.     [x] Specific Instructions for subsequent treatments: cont ROM ex      Time In: 2:00 pm          Time Out: 3:00 pm     Electronically signed by:  Laura Mcallister PTA

## 2021-06-25 ENCOUNTER — HOSPITAL ENCOUNTER (OUTPATIENT)
Dept: PHYSICAL THERAPY | Facility: CLINIC | Age: 71
Setting detail: THERAPIES SERIES
Discharge: HOME | End: 2021-06-25
Payer: MEDICARE

## 2021-06-25 PROCEDURE — 97140 MANUAL THERAPY 1/> REGIONS: CPT

## 2021-06-25 PROCEDURE — 97110 THERAPEUTIC EXERCISES: CPT

## 2021-06-25 NOTE — FLOWSHEET NOTE
[] [facility]  [address]  [phone]  F: [phone] [] [address]  [facility] 36   [address]  P: [phone]  F: [phone] [x] [address]  Therapy  [facility]  P: [phone]  F: [phone] [] [address]  P: [phone]  F: [phone] [] [address]  [facility]   [address]   [place]: [phone]  F: [phone]      Physical Therapy Daily Treatment Note    Date:  2021  Patient Name:  Norma Perez    :  1950  MRN: [id number]  Physician:  Yadira [address]: MEDICARE              Eligibility Status:  Eligible     Secondary Insurance (if applicable) [employer]              Eligibility Status: Eligible      DOS: 5.3.21  # of visits allowed/remaining: Based on medical necessity  Source: Website  Spoke To:  [employer]  Reference: [id number]  Medical Diagnosis: S/P ORIF R distal radius fx                    Rehab Codes: S52.501A  Onset Date: 3/3021          Next Dr. Anju Griffin:   Visit# / total visits: , Progress note for Medicare patient due at visit 20     Cancels/No Shows:      Subjective:    Pain:  [] Yes  [x] No Location: R wrist Pain Rating: (0-10 scale) -/10  Pain altered Tx:  [x] No  [] Yes  Action:   Comments: Pt reports stiffness today but no pain. Has follow up with her doctor on Wednesday.      Objective:  Modalities: MHP to R wrist post ex - not today  Precautions:  Exercises:   Reps/ Time Weight/ Level Completed Comments   UBE  4'  x    Stretch wrist flex, ext 10x10s   x     Stretch wrist pron/sup 10x10s   x     Stretch PIP,MP,DIP flex 10x10s   x     Stretch PIP,MP,DIP ext 10x10s  x    AROM fingers: fist 15x  x    Finger Abd/Add 15x x    opposition 15x  x    Thumb flex/abd 15x  x    Towel squeeze 5\" x15  x    AROM wrist: all dir 15x  x    Pronation/Supination 15x Black Wand+1 weight x    Pronation/Supination DB 10xea 1#     Flex, ext DB x15 each 1#/2# x    Wrist maze x3  x    Finger web flex/ext 2x10ea      Wrist Circles - fwd/back 15x  x    Digiflex x15 red x    Clothespin Squeeze 15x ea Red/Yellow x    Foam Squeeze 15x3\" Blue     Putty Squeeze  x15      Putty Push x15 ea      Tabletop Weight Shifts 20x      Wall Push Up x15  x    Manual wrist PROM 10m  x All directions   Other:    Treatment Charges: Mins Units   []  Modalities - MHP     [x]  Ther Exercise 35 2   [x]  Manual Therapy 10 1   []  Ther Activities     []  Aquatics     []  Vasocompression     []  Other     Total Treatment time 45 3       Assessment: [x] Progressing toward goals. Able to increase weight for wrist extension as well as resistance for digiflex. Cont to need cues with ex to avoid compensatory movements with the shoulder. Emphasized importance of HEP. [] No change. [] Other:  [x] Patient would continue to benefit from skilled physical therapy services in order to: Pt limited by R wrist, hand pain, ROM loss, hand, wrist, elbow weakness w/ limitations in functional activties S/P ORIF R distal radius fx. Will initiate wrist, hand passive stretching, ROM exercises    SHORT TERM GOALS ( 8 visits)  Wrist/hand pain = 0  Wrist/hand ROM = WNL  Elbow, wrist, hand strength = 4+/5  Wrist/hand function: , lift, twist w/o pain     LONG TERM GOALS ( 12 visits)  Independent Home Exercise program  Return to normal activity     PATIENT GOAL  Better use of hand    Pt. Education:  [x] Yes  [] No  [x] Reviewed Prior HEP/Ed  Method of Education: [x] Verbal  [x] Demo  [] Written  Comprehension of Education:  [x] Verbalizes understanding. [x] Demonstrates understanding. [] Needs review. [x] Demonstrates/verbalizes HEP/Ed previously given.      Plan: [x] Continue current frequency

## 2021-06-25 NOTE — PRE-CERTIFICATION NOTE
Medicare Cap   [x] Physical Therapy  [] Speech Therapy  [] Occupational therapy  *PT and Speech caps combine      $2100 Limit for PT and Speech combined  $2100 Limit for OT individually  At the beginning of the month where you expect to go over $2100, please add the [address] [place] [address] modifier      Patient Name: Maty Schuler: 1950    Note:  This is an estimate of charges billed.      Date of [address] Name # units/ charge $$$ charge Daily Total Charge Ongoing Total $$$   5/3/21 Eval+ther ex  82.82+23.22  106.04   5/7/21 Ther ex 2+man  25.26+19.74+18.45 63.45 169.49   5/10/21 therex2+man   63.45 232.94   5/21 therex2+man   63.45 296.39   5/25 therex3  25.26+19.74+19.74 64.74 361.13   5/28 therex2+man   63.45 424.58   6/2 therex2 + man   63.45 488.03   6/4 therex2+man   63.45 551.48   6/7 3 therex+man  25.26+19.74+19.74+18.45 83.19 634.67   6/11 therex2+man   63.45 698.12   6/14 therex2+man  29.21+22.84+21.34 73.39 771.51   6/18 therex2+man  29.21+22.84+21.34 73.39 844.90   6/22 therex2+man   73.39 918.29   6/25 therex2+man   73.39 991.68

## 2021-06-28 ENCOUNTER — HOSPITAL ENCOUNTER (OUTPATIENT)
Dept: PHYSICAL THERAPY | Facility: CLINIC | Age: 71
Setting detail: THERAPIES SERIES
Discharge: HOME | End: 2021-06-28
Payer: MEDICARE

## 2021-06-28 PROCEDURE — 97140 MANUAL THERAPY 1/> REGIONS: CPT

## 2021-06-28 PROCEDURE — 97110 THERAPEUTIC EXERCISES: CPT

## 2021-06-28 NOTE — FLOWSHEET NOTE
[] [facility] [address]  [address]  [phone]  F: [phone] [] [address]  [facility] 36   [address]  P: [phone]  F: [phone] [x] [address]  Therapy  [facility]  P: [phone]  F: [phone] [] [address]  P: [phone]  F: [phone] [] [address]  [facility]   [address]   [place]: [phone]  F: [phone]      Physical Therapy Daily Treatment Note    Date:  2021  Patient Name:  Rasheeda Benítez    :  1950  MRN: [id number]  Physician:  Yadira [address]: MEDICARE              Eligibility Status:  Eligible     Secondary Insurance (if applicable) MED[employer]              Eligibility Status: Eligible      DOS: 5.3.21  # of visits allowed/remaining: Based on medical necessity  Source: Website  Spoke To:  [employer]  Reference: [id number]  Medical Diagnosis: S/P ORIF R distal radius fx                    Rehab Codes: S52.501A  Onset Date: 3/3021          Next Dr. Andreea Cooper:   Visit# / total visits: , Progress note for Medicare patient due at visit 20     Cancels/No Shows:      Subjective:    Pain:  [] Yes  [x] No Location: R wrist Pain Rating: (0-10 scale) -/10  Pain altered Tx:  [x] No  [] Yes  Action:   Comments: Pt reports stiffness but no pain. Has been trying to use her hand more and more everyday.      Objective:  Modalities: MHP to R wrist post ex - not today  Precautions:  Exercises:   Reps/ Time Weight/ Level Completed Comments   UBE  4'  x    Stretch wrist flex, ext 10x10s   x     Stretch wrist pron/sup 10x10s   x     Stretch PIP,MP,DIP flex 10x10s   x     Stretch PIP,MP,DIP ext 10x10s  x    AROM fingers: fist 15x  x    Finger Abd/Add toward long and short term goals.     [x] Specific Instructions for subsequent treatments: cont ROM ex      Time In: 11:00 am          Time Out: 11:54 am     Electronically signed by:  Laura Mcallister PTA

## 2021-06-30 ENCOUNTER — OFFICE VISIT (OUTPATIENT)
Dept: ORTHOPEDIC SURGERY | Age: 71
End: 2021-06-30

## 2021-06-30 VITALS — TEMPERATURE: 97 F | BODY MASS INDEX: 17.52 KG/M2 | HEIGHT: 66 IN | WEIGHT: 109 LBS

## 2021-06-30 DIAGNOSIS — S52.501D CLOSED FRACTURE OF DISTAL END OF RIGHT RADIUS WITH ROUTINE HEALING, UNSPECIFIED FRACTURE MORPHOLOGY, SUBSEQUENT ENCOUNTER: Primary | ICD-10-CM

## 2021-06-30 PROCEDURE — 99024 POSTOP FOLLOW-UP VISIT: CPT | Performed by: ORTHOPAEDIC SURGERY

## 2021-06-30 NOTE — PROGRESS NOTES
[facility]  [address] [place], [address] [place] [address]  Dept: [phone]    Orthopedic Clinic Post Op    SUBJECTIVE: Tiago Anderson is a 66 y.o. female who presents for postoperative follow up after right distal radius ORIF. DOS was 3/30/2021. Therefore we are less than 3 months post-op. Patient has been in occupational therapy for hand and wrist range of motion. At her last postoperative visit she had significantly diminished range of motion in both flexion, extension, supination, and thumb MCP range of motion. She still endorses decrease in range of motion, but does believe she is progressing. No new numbness or tingling. No new falls or injuries. ROS:  Constitutional: Negative for fever and chills. Musculoskeletal: Positive for right wrist and hand decreased range of motion. OBJECTIVE:  RUE: Incision over the volar aspect of the wrist is completely healed with no gross signs of infection. Active and passive range of motion of the wrist in flexion/extension is 0 to 35 degrees flexion and extension. She has full pronation of the wrist, and slight diminished supination ability 0 to 80 degrees. MCP joint of the right thumb is diminished but she can make a full fist with her remaining digits. Adequate tenodesis effect of all tendons. Able to flex and extend the IP joint of the right thumb with no issue. Radial pulse 2+ with brisk cap refill of the digits. PROCEDURES: None. RADIOLOGY:  No new radiographs were obtained in the office today. Prior radiographs from 5/26/2021 reviewed with the patient and shows a history of a distal radius fracture status post hardware fixation that is completely healed. ASSESSMENT:  66y.o. year old female who is nearly 3 months postop from a right distal radius ORIF. PLAN:  Tiago Anderson is here for postoperative follow-up nearly 3 months postop from a right distal radius ORIF. DOS on 3/30/2021.   Patient is doing well and radiographs demonstrate complete healing of the fracture. However she does have decreased range of motion but it is improving. We would like her to continue with occupational therapy for her hand and wrist range of motion. We will see her back in the office in 2 to 3 months to make sure since she is progressing appropriately. We have full expectoration that she will regain more range of motion by then. -Return in about 3 months (around 9/30/2021) for recheck ROM. No orders of the defined types were placed in this encounter. No orders of the defined types were placed in this encounter. Electronically signed by Jodi Lefort, DO, on 6/30/2021 at 10:07 AM    This dictation was generated by voice recognition computer software. Although all attempts are made to edit the dictation for accuracy, there may be errors in the transcription that are not intended. The actual meaning should be extrapolated by the context of the sentence.

## 2021-07-02 ENCOUNTER — HOSPITAL ENCOUNTER (OUTPATIENT)
Dept: PHYSICAL THERAPY | Facility: CLINIC | Age: 71
Setting detail: THERAPIES SERIES
Discharge: HOME | End: 2021-07-02
Payer: MEDICARE

## 2021-07-02 PROCEDURE — 97110 THERAPEUTIC EXERCISES: CPT

## 2021-07-02 PROCEDURE — 97140 MANUAL THERAPY 1/> REGIONS: CPT

## 2021-07-02 NOTE — FLOWSHEET NOTE
[] [facility].  [address]  [phone]  F: [phone] [] [address]  [facility]   [address]  P: [phone]  F: [phone] [x] [facility] &  Therapy  1500 [facility]  P: [phone]  F: [phone] [] [address]  P: [phone]  F: [phone] [] [address]  [facility]   [place]: [phone]  F: [phone]      Physical Therapy Daily Treatment Note    Date:  2021  Patient Name:  Abigail Monroy    :  1950  MRN: [id number]  Physician:  Yadira [address]: MEDICARE              Eligibility Status:  Eligible     Secondary Insurance (if applicable) MED[employer]              Eligibility Status: Eligible      DOS: 5.3.21  # of visits allowed/remaining: Based on medical necessity  Source: Website  Spoke To:  [employer]  Reference: [id number]  Medical Diagnosis: S/P ORIF R distal radius fx                    Rehab Codes: S52.501A  Onset Date: 3/3021          Next Dr. Aixa Ny:   Visit# / total visits: 15/24, Progress note for Medicare patient due at visit 20     Cancels/No Shows:      Subjective:    Pain:  [] Yes  [x] No Location: R wrist Pain Rating: (0-10 scale) -/10  Pain altered Tx:  [x] No  [] Yes  Action:   Comments: Had follow up with Darci Valentin who states she is showing progress but still needs to improve ROM.     Objective:  Modalities: MHP to R wrist post ex - not today  Precautions:  Exercises:   Reps/ Time Weight/ Level Completed Comments   UBE  4'  x    Stretch wrist flex, ext 10x10s   x     Stretch wrist pron/sup 10x10s   x     Stretch PIP,MP,DIP flex 10x10s   x     Stretch PIP,MP,DIP ext 10x10s  x    AROM fingers: fist 15x  x    Finger Abd/Add 15x  x    opposition 15x  x    Thumb flex/abd 15x  x    Towel squeeze 5\" x15  x    AROM wrist: all dir 15x  x    Pronation/Supination 15x Black Wand+1 weight x    Pronation/Supination DB 10xea 1#     Flex, ext DB x15 each 2# x    Wrist maze x3  x    Finger web flex/ext 2x10ea      Wrist Circles - fwd/back 15x  x    Digiflex x15 red x    Clothespin Squeeze 15x ea Red x    Foam Squeeze 15x3\" Blue     Putty Squeeze  x15      Putty Push x15 ea      Tabletop Weight Shifts 20x      Wall Push Up x15  x    Tband: row, ext, bi/tri x15ea orange x           Manual wrist PROM 10m  x All directions   Other:    Treatment Charges: Mins Units   []  Modalities - MHP     [x]  Ther Exercise 35 2   [x]  Manual Therapy 10 1   []  Ther Activities     []  Aquatics     []  Vasocompression     []  Other     Total Treatment time 45 3       Assessment: [x] Progressing toward goals. Added tband ex to work on  and UE strength. Issued new ex for HEP. Cont to end with PROM in all planes. [] No change. [] Other:  [x] Patient would continue to benefit from skilled physical therapy services in order to: Pt limited by R wrist, hand pain, ROM loss, hand, wrist, elbow weakness w/ limitations in functional activties S/P ORIF R distal radius fx. Will initiate wrist, hand passive stretching, ROM exercises    SHORT TERM GOALS ( 8 visits)  Wrist/hand pain = 0  Wrist/hand ROM = WNL  Elbow, wrist, hand strength = 4+/5  Wrist/hand function: , lift, twist w/o pain     LONG TERM GOALS ( 12 visits)  Independent Home Exercise program  Return to normal activity     PATIENT GOAL  Better use of hand    Pt. Education:  [x] Yes  [] No  [x] Reviewed Prior HEP/Ed  Method of Education: [x] Verbal  [x] Demo  [x] Written  Comprehension of Education:  [x] Verbalizes understanding. [x] Demonstrates understanding. [] Needs review. [x] Demonstrates/verbalizes HEP/Ed previously given.      Plan: [x] Continue current frequency toward long and short term goals.     [x] Specific Instructions for subsequent treatments: cont ROM ex      Time In: 3:00 pm          Time Out: 3:58 pm    Electronically signed by:  Rosemary Lemon PTA

## 2021-07-02 NOTE — PRE-CERTIFICATION NOTE
Medicare Cap   [x] Physical Therapy  [] Speech Therapy  [] Occupational therapy  *PT and Speech caps combine      $2100 Limit for PT and Speech combined  $2100 Limit for OT individually  At the beginning of the month where you expect to go over $2100, please add the [address] [place] modifier      Patient Name: Yesica Foley: 1950    Note:  This is an estimate of charges billed.      Date of [address] Name # units/ charge $$$ charge Daily Total Charge Ongoing Total $$$   5/3/21 Eval+ther ex  82.82+23.22  106.04   5/7/21 Ther ex 2+man  25.26+19.74+18.45 63.45 169.49   5/10/21 therex2+man   63.45 232.94   5/21 therex2+man   63.45 296.39   5/25 therex3  25.26+19.74+19.74 64.74 361.13   5/28 therex2+man   63.45 424.58   6/2 therex2 + man   63.45 488.03   6/4 therex2+man   63.45 551.48   6/7 3 therex+man  25.26+19.74+19.74+18.45 83.19 634.67   6/11 therex2+man   63.45 698.12   6/14 therex2+man  29.21+22.84+21.34 73.39 771.51   6/18 therex2+man  29.21+22.84+21.34 73.39 844.90   6/22 therex2+man   73.39 918.29   6/25 therex2+man   73.39 991.68   6/28 therex2+man   73.39 1065.08   7/2 therex2+man   73.39 1138.47

## 2021-07-06 ENCOUNTER — HOSPITAL ENCOUNTER (OUTPATIENT)
Dept: PHYSICAL THERAPY | Facility: CLINIC | Age: 71
Setting detail: THERAPIES SERIES
Discharge: HOME | End: 2021-07-06
Payer: MEDICARE

## 2021-07-06 PROCEDURE — 97140 MANUAL THERAPY 1/> REGIONS: CPT

## 2021-07-06 PROCEDURE — 97110 THERAPEUTIC EXERCISES: CPT

## 2021-07-06 NOTE — PRE-CERTIFICATION NOTE
Medicare Cap   [x] Physical Therapy  [] Speech Therapy  [] Occupational therapy  *PT and Speech caps combine      $2100 Limit for PT and Speech combined  $2100 Limit for OT individually  At the beginning of the month where you expect to go over $2100, please add the [address] [place] 22 modifier      Patient Name: Brendan Godfreyar: 1950    Note:  This is an estimate of charges billed.      Date of [address] Name # units/ charge $$$ charge Daily Total Charge Ongoing Total $$$   5/3/21 Eval+ther ex  82.82+23.22  106.04   5/7/21 Ther ex 2+man  25.26+19.74+18.45 63.45 169.49   5/10/21 therex2+man   63.45 232.94   5/21 therex2+man   63.45 296.39   5/25 therex3  25.26+19.74+19.74 64.74 361.13   5/28 therex2+man   63.45 424.58   6/2 therex2 + man   63.45 488.03   6/4 therex2+man   63.45 551.48   6/7 3 therex+man  25.26+19.74+19.74+18.45 83.19 634.67   6/11 therex2+man   63.45 698.12   6/14 therex2+man  29.21+22.84+21.34 73.39 771.51   6/18 therex2+man  29.21+22.84+21.34 73.39 844.90   6/22 therex2+man   73.39 918.29   6/25 therex2+man   73.39 991.68   6/28 therex2+man   73.39 1065.08   7/2 therex2+man   73.39 1138.47   7/6 therex2+man   73.39 1211.86

## 2021-07-06 NOTE — FLOWSHEET NOTE
[] [facility] [facility]  [address]  [phone]  F: [phone] [] [address]  [facility] 36   [address]  P: [phone]  F: [phone] [x] [address]  Therapy  [address] [facility]  P: [phone]  F: [phone] [] [address]  P: [phone]  F: [phone] [] [address]  [facility]   [address]   [place]: [phone]  F: [phone]      Physical Therapy Daily Treatment Note    Date:  2021  Patient Name:  David Norman    :  1950  MRN: [id number]  Physician:  Yadira DumontLonnie Street: MEDICARE              Eligibility Status:  Eligible     Secondary Insurance (if applicable) [employer]              Eligibility Status: Eligible      DOS: 5.3.21  # of visits allowed/remaining: Based on medical necessity  Source: Website  Spoke To:  [email or web address]  Reference: [id number]  Medical Diagnosis: S/P ORIF R distal radius fx                    Rehab Codes: S52.501A  Onset Date: 3/3021          Next Dr. Rehana Tse:   Visit# / total visits: , Progress note for Medicare patient due at visit 20     Cancels/No Shows:      Subjective:    Pain:  [] Yes  [x] No Location: R wrist Pain Rating: (0-10 scale) -/10  Pain altered Tx:  [x] No  [] Yes  Action:   Comments: Notes increased aching after using her hand a lot over the weekend.     Objective:  Modalities: MHP to R wrist post ex - not today  Precautions:  Exercises:   Reps/ Time Weight/ Level Completed Comments   UBE  4'  x    Stretch wrist flex, ext 10x10s   x     Stretch wrist pron/sup 10x10s   x     Stretch PIP,MP,DIP flex 10x10s   x     Stretch PIP,MP,DIP ext 10x10s  x    AROM fingers: fist 15x  x    Finger Abd/Add 15x  x    opposition 15x      Thumb flex/abd 15x  x    Towel squeeze 5\" x15  x    AROM wrist: all dir 15x  x    Pronation/Supination 15x Black Wand+1 weight x    Pronation/Supination DB 10xea 1#     Flex, ext DB x15 each 2# x    Wrist maze x3  x    Finger web flex/ext 2x10ea      Wrist Circles - fwd/back 15x  x    Digiflex x15 red x    Clothespin Squeeze 15x ea Red x    Foam Squeeze 15x3\" Blue     Putty Squeeze  x15      Putty Push x15 ea      Tabletop Weight Shifts 20x      Wall Push Up x15      Tband: row, ext, bi/tri x15ea orange            Manual wrist PROM 10m  x All directions   Other:    Treatment Charges: Mins Units   []  Modalities - MHP     [x]  Ther Exercise 35 2   [x]  Manual Therapy 10 1   []  Ther Activities     []  Aquatics     []  Vasocompression     []  Other     Total Treatment time 45 3       Assessment: [x] Progressing toward goals. Cont with ex per log above with good tolerance. Min cues needed for reduced compensatory movements of the elbow and shoulder. Emphasized HEP compliance. [] No change. [] Other:  [x] Patient would continue to benefit from skilled physical therapy services in order to: Pt limited by R wrist, hand pain, ROM loss, hand, wrist, elbow weakness w/ limitations in functional activties S/P ORIF R distal radius fx. Will initiate wrist, hand passive stretching, ROM exercises    SHORT TERM GOALS ( 8 visits)  Wrist/hand pain = 0  Wrist/hand ROM = WNL  Elbow, wrist, hand strength = 4+/5  Wrist/hand function: , lift, twist w/o pain     LONG TERM GOALS ( 12 visits)  Independent Home Exercise program  Return to normal activity     PATIENT GOAL  Better use of hand    Pt. Education:  [x] Yes  [] No  [x] Reviewed Prior HEP/Ed  Method of Education: [x] Verbal  [x] Demo  [x] Written  Comprehension of Education:  [x] Verbalizes understanding. [x] Demonstrates understanding. [] Needs review. [x] Demonstrates/verbalizes HEP/Ed previously given.      Plan: [x] Continue current frequency toward long and short term goals.     [x] Specific Instructions for subsequent treatments: cont ROM ex      Time In: 1:00 pm          Time Out: 1:55 pm    Electronically signed by:  Natali Cabrera PTA

## 2021-07-09 ENCOUNTER — HOSPITAL ENCOUNTER (OUTPATIENT)
Dept: PHYSICAL THERAPY | Facility: CLINIC | Age: 71
Setting detail: THERAPIES SERIES
Discharge: HOME | End: 2021-07-09
Payer: MEDICARE

## 2021-07-09 PROCEDURE — 97140 MANUAL THERAPY 1/> REGIONS: CPT

## 2021-07-09 PROCEDURE — 97110 THERAPEUTIC EXERCISES: CPT

## 2021-07-09 NOTE — FLOWSHEET NOTE
[] [address]  [address]  [phone]  F: [phone] [] [address]  [facility]   [address]  P: [phone]  F: [phone] [x] [address]  Therapy  [address] [facility] [address]  P: [phone]  F: [phone] [] [address]  P: [phone]  F: [phone] [] [address]  [facility]   [address]   [place]: [phone]  F: [phone]      Physical Therapy Daily Treatment Note    Date:  2021  Patient Name:  Alicia Keith    :  1950  MRN: [id number]  Physician:  Yadira [place] [address]: MEDICARE              Eligibility Status:  Eligible     Secondary Insurance (if applicable) [employer]              Eligibility Status: Eligible      DOS: 5.3.21  # of visits allowed/remaining: Based on medical necessity  Source: Website  Spoke To:  [employer]  Reference: [id number]  Medical Diagnosis: S/P ORIF R distal radius fx                    Rehab Codes: S52.501A  Onset Date: 3/3021          Next Dr. Angela De Santiago:   Visit# / total visits: , Progress note for Medicare patient due at visit 20     Cancels/No Shows:      Subjective:    Pain:  [] Yes  [x] No Location: R wrist Pain Rating: (0-10 scale) -/10  Pain altered Tx:  [x] No  [] Yes  Action:   Comments: States she is a little sore from cutting up her meal last night.      Objective:  Modalities: MHP to R wrist post ex - not today  Precautions:  Exercises:   Reps/ Time Weight/ Level Completed Comments   UBE  4'  x    Stretch wrist flex, ext 10x10s   x     Stretch wrist pron/sup 10x10s   x     Stretch PIP,MP,DIP flex 10x10s   x     Stretch PIP,MP,DIP ext 10x10s  x    AROM fingers: fist 15x  x    Finger Abd/Add 15x  x    opposition 15x Thumb flex/abd 15x  x    Towel squeeze 5\" x15  x    AROM wrist: all dir 15x  x    Pronation/Supination 15x Black Wand+1 weight x    Pronation/Supination DB 10xea 1#     Flex, ext DB x15 each 2# x    Wrist maze x3  x    Finger web flex/ext 2x10ea      Wrist Circles - fwd/back 15x  x    Digiflex x15 red x    Clothespin Squeeze 15x ea Red x    Foam Squeeze 15x3\" Blue     Putty Squeeze  x15      Putty Push x15 ea      Tabletop Weight Shifts 20x      Wall Push Up x15      Tband: row, ext, bi/tri x15ea orange x           Manual wrist PROM 10m  x All directions   Other:    Treatment Charges: Mins Units   []  Modalities - MHP     [x]  Ther Exercise 35 2   [x]  Manual Therapy 10 1   []  Ther Activities     []  Aquatics     []  Vasocompression     []  Other     Total Treatment time 45 3       Assessment: [x] Progressing toward goals. Cont with ex per log above with good tolerance. Resumed tband ex, cues needed for proper technique. Cont to end with PROM to wrist and thumb. Scheduled for recheck with primary therapist at next visit. [] No change. [] Other:  [x] Patient would continue to benefit from skilled physical therapy services in order to: Pt limited by R wrist, hand pain, ROM loss, hand, wrist, elbow weakness w/ limitations in functional activties S/P ORIF R distal radius fx. Will initiate wrist, hand passive stretching, ROM exercises    SHORT TERM GOALS ( 8 visits)  Wrist/hand pain = 0  Wrist/hand ROM = WNL  Elbow, wrist, hand strength = 4+/5  Wrist/hand function: , lift, twist w/o pain     LONG TERM GOALS ( 12 visits)  Independent Home Exercise program  Return to normal activity     PATIENT GOAL  Better use of hand    Pt. Education:  [x] Yes  [] No  [x] Reviewed Prior HEP/Ed  Method of Education: [x] Verbal  [x] Demo  [x] Written  Comprehension of Education:  [x] Verbalizes understanding. [x] Demonstrates understanding. [] Needs review.   [x] Demonstrates/verbalizes HEP/Ed previously given.     Plan: [x] Continue current frequency toward long and short term goals.     [x] Specific Instructions for subsequent treatments: cont ROM ex      Time In: 1:00 pm          Time Out: 1:55 pm    Electronically signed by:  Satya Padron PTA

## 2021-07-09 NOTE — PRE-CERTIFICATION NOTE
Medicare Cap   [x] Physical Therapy  [] Speech Therapy  [] Occupational therapy  *PT and Speech caps combine      $2100 Limit for PT and Speech combined  $2100 Limit for OT individually  At the beginning of the month where you expect to go over $2100, please add the [address] [place] [address] modifier      Patient Name: Melly Arambula: 1950    Note:  This is an estimate of charges billed.      Date of [address] Name # units/ charge $$$ charge Daily Total Charge Ongoing Total $$$   5/3/21 Eval+ther ex  82.82+23.22  106.04   5/7/21 Ther ex 2+man  25.26+19.74+18.45 63.45 169.49   5/10/21 therex2+man   63.45 232.94   5/21 therex2+man   63.45 296.39   5/25 therex3  25.26+19.74+19.74 64.74 361.13   5/28 therex2+man   63.45 424.58   6/2 therex2 + man   63.45 488.03   6/4 therex2+man   63.45 551.48   6/7 3 therex+man  25.26+19.74+19.74+18.45 83.19 634.67   6/11 therex2+man   63.45 698.12   6/14 therex2+man  29.21+22.84+21.34 73.39 771.51   6/18 therex2+man  29.21+22.84+21.34 73.39 844.90   6/22 therex2+man   73.39 918.29   6/25 therex2+man   73.39 991.68   6/28 therex2+man   73.39 1065.08   7/2 therex2+man   73.39 1138.47   7/6 therex2+man   73.39 1211.86   7/9 therex2+man   73.39 1285.25

## 2021-07-12 ENCOUNTER — HOSPITAL ENCOUNTER (OUTPATIENT)
Dept: PHYSICAL THERAPY | Facility: CLINIC | Age: 71
Setting detail: THERAPIES SERIES
Discharge: HOME | End: 2021-07-12
Payer: MEDICARE

## 2021-07-12 PROCEDURE — 97110 THERAPEUTIC EXERCISES: CPT

## 2021-07-12 PROCEDURE — 97140 MANUAL THERAPY 1/> REGIONS: CPT

## 2021-07-12 NOTE — PRE-CERTIFICATION NOTE
Medicare Cap   [x] Physical Therapy  [] Speech Therapy  [] Occupational therapy  *PT and Speech caps combine      $2100 Limit for PT and Speech combined  $2100 Limit for OT individually  At the beginning of the month where you expect to go over $2100, please add the [address] [place] [address] modifier      Patient Name: Victor Manuel Melendez: 1950    Note:  This is an estimate of charges billed.      Date of [address] Name # units/ charge $$$ charge Daily Total Charge Ongoing Total $$$   5/3/21 Eval+ther ex  82.82+23.22  106.04   5/7/21 Ther ex 2+man  25.26+19.74+18.45 63.45 169.49   5/10/21 therex2+man   63.45 232.94   5/21 therex2+man   63.45 296.39   5/25 therex3  25.26+19.74+19.74 64.74 361.13   5/28 therex2+man   63.45 424.58   6/2 therex2 + man   63.45 488.03   6/4 therex2+man   63.45 551.48   6/7 3 therex+man  25.26+19.74+19.74+18.45 83.19 634.67   6/11 therex2+man   63.45 698.12   6/14 therex2+man  29.21+22.84+21.34 73.39 771.51   6/18 therex2+man  29.21+22.84+21.34 73.39 844.90   6/22 therex2+man   73.39 918.29   6/25 therex2+man   73.39 991.68   6/28 therex2+man   73.39 1065.08   7/2 therex2+man   73.39 1138.47   7/6 therex2+man   73.39 1211.86   7/9 therex2+man   73.39 1285.25   7/9 therex2+man   73.39 1358.64

## 2021-07-12 NOTE — FLOWSHEET NOTE
[] [address]  [address]  [phone]  F: [phone] [] [address]  [facility] 36   [address]  P: [phone]  F: [phone] [x] [address]  Therapy  [address] [facility]  P: [phone]  F: [phone] [] [address]  P: [phone]  F: [phone] [] [address]  [facility]   [address]   [place]: [phone]  F: [phone]      Physical Therapy Daily Treatment Note    Date:  2021  Patient Name:  Briseida Haas    :  1950  MRN: [id number]  Physician:  Taylor Brennan: MEDICARE              Eligibility Status:  Eligible     Secondary Insurance (if applicable) [employer]              Eligibility Status: Eligible      DOS: 5.3.21  # of visits allowed/remaining: Based on medical necessity  Source: Website  Spoke To:  [employer]  Reference: [id number]  Medical Diagnosis: S/P ORIF R distal radius fx                    Rehab Codes: S52.501A  Onset Date: 3/3021          Next Dr. Marcella Sorenson:   Visit# / total visits: , Progress note for Medicare patient due at visit 20     Cancels/No Shows:      Subjective:    Pain:  [] Yes  [x] No Location: R wrist Pain Rating: (0-10 scale) -/10  Pain altered Tx:  [x] No  [] Yes  Action:   Comments: Pt reports no pain just aching.      Objective:  Modalities: MHP to R wrist post ex - not today  Precautions:  Exercises:   Reps/ Time Weight/ Level Completed Comments   UBE  4'  x    Stretch wrist flex, ext 10x10s   x     Stretch wrist pron/sup 10x10s   x     Stretch PIP,MP,DIP flex 10x10s   x     Stretch PIP,MP,DIP ext 10x10s  x    AROM fingers: fist 15x  x    Finger Abd/Add 15x  x    opposition 15x      Thumb flex/abd 15x  x Towel squeeze 5\" x15  x    AROM wrist: all dir 15x  x    Pronation/Supination 15x Black Wand+1 weight x    Pronation/Supination DB 10xea 1#     Flex, ext DB x15 each 2# x    Wrist maze x3  x    Finger web flex/ext 2x10ea      Wrist Circles - fwd/back 15x  x    Digiflex x15 red x    Clothespin Squeeze 15x ea Red x    Foam Squeeze 15x3\" Blue     Putty Squeeze  x15      Putty Push x15 ea      Tabletop Weight Shifts 20x      Wall Push Up x15      Tband: row, ext, bi/tri x15ea orange            Manual wrist PROM 10m  x All directions   Other:    Treatment Charges: Mins Units   []  Modalities - MHP     [x]  Ther Exercise 20 1   [x]  Manual Therapy 10 1   []  Ther Activities     []  Aquatics     []  Vasocompression     []  Other     Total Treatment time 30 2       Assessment: [x] Progressing toward goals. Cont to be limited especially with thumb ROM. Cues for eccentric control with strengthening ex. [] No change. [] Other:  [x] Patient would continue to benefit from skilled physical therapy services in order to: Pt limited by R wrist, hand pain, ROM loss, hand, wrist, elbow weakness w/ limitations in functional activties S/P ORIF R distal radius fx. Will initiate wrist, hand passive stretching, ROM exercises    SHORT TERM GOALS ( 8 visits)  Wrist/hand pain = 0  Wrist/hand ROM = WNL  Elbow, wrist, hand strength = 4+/5  Wrist/hand function: , lift, twist w/o pain     LONG TERM GOALS ( 12 visits)  Independent Home Exercise program  Return to normal activity     PATIENT GOAL  Better use of hand    Pt. Education:  [x] Yes  [] No  [x] Reviewed Prior HEP/Ed  Method of Education: [x] Verbal  [x] Demo  [x] Written  Comprehension of Education:  [x] Verbalizes understanding. [x] Demonstrates understanding. [] Needs review. [x] Demonstrates/verbalizes HEP/Ed previously given. Plan: [x] Continue current frequency toward long and short term goals.     [x] Specific Instructions for subsequent treatments: cont ROM ex      Time In: 11:30 am         Time Out: 12:10 pm    Electronically signed by:  Oly Watkins PTA

## 2021-07-12 NOTE — FLOWSHEET NOTE
functional deficits    SHORT TERM GOALS ( 8 visits)  Wrist/hand pain = 0  Wrist/hand ROM = WNL  Elbow, wrist, hand strength = 4+/5  Wrist/hand function: , lift, twist w/o pain    LONG TERM GOALS ( 12 visits)  Independent Home Exercise program  Return to normal activity    Rehab Potential:  [x] Good  [] Fair  [] Poor   Suggested Professional Referral:  [x] No  [] Yes:  Barriers to Goal Achievement[de-identified]  [x] No  [] Yes:  Domestic Concerns:  [x] No  [] Yes:    Pt. Education:  [x] Plans/Goals, Risks/Benefits discussed  [x] Home exercise program  Method of Education: [x] Verbal  [x] Demo  [x] Written  Comprehension of Education:  [x] Verbalizes understanding. [x] Demonstrates understanding. [] Needs Review. [] Demonstrates/verbalizes understanding of HEP/Ed previously given. Treatment Plan:  [x] Therapeutic Exercise    [] Modalities:  [] Therapeutic Activity    [] Ultrasound  [] Electrical Stimulation  [] Gait Training     [x] Massage       [] Lumbar/Cervical Traction  [] Neuromuscular Re-education [x] Cold/hotpack [] Instruction in HEP  [x] Manual Therapy   [] Aquatic Therapy [] Other:     [] Iontophoresis: 4 mg/mL Dexamethasone Sodium Phosphate 40-80 mAmin  [] Drug allergies reviewed    _______Initials           _______Date     Frequency:  2 x/week for 12 visits      Time in: 1500     Time out: 1600    Electronically signed by: Yessi Ballesteros, PT        Physician Signature:________________________________Date:__________________  By signing above, I have reviewed this plan of care and certify a need for medically   necessary rehabilitation services.      *PLEASE SIGN ABOVE AND FAX BACK ALL PAGES*

## 2021-07-16 ENCOUNTER — HOSPITAL ENCOUNTER (OUTPATIENT)
Dept: PHYSICAL THERAPY | Facility: CLINIC | Age: 71
Setting detail: THERAPIES SERIES
Discharge: HOME | End: 2021-07-16
Payer: MEDICARE

## 2021-07-16 PROCEDURE — 97110 THERAPEUTIC EXERCISES: CPT

## 2021-07-16 PROCEDURE — 97140 MANUAL THERAPY 1/> REGIONS: CPT

## 2021-07-16 NOTE — FLOWSHEET NOTE
[] [facility]  [address]  [phone]  F: [phone] [] [address]  [facility] 36   [address]  P: [phone]  F: [phone] [x] [facility] &  Therapy  [facility] [facility]  P: [phone]  F: [phone] [] [address]  P: [phone]  F: [phone] [] [address]  [facility]   [address]   [place]: [phone]  F: [phone]      Physical Therapy Daily Treatment Note    Date:  2021  Patient Name:  Virgilio Lilly    :  1950  MRN: [id number]  Physician:  Yadira [address]: MEDICARE              Eligibility Status:  Eligible     Secondary Insurance (if applicable) [employer]              Eligibility Status: Eligible      DOS: 5.3.21  # of visits allowed/remaining: Based on medical necessity  Source: Website  Spoke To:  [employer]  Reference: [id number]  Medical Diagnosis: S/P ORIF R distal radius fx                    Rehab Codes: S52.501A  Onset Date: 3/3021          Next Dr. Valeriy Dobbs:   Visit# / total visits: , Progress note for Medicare patient due at visit 20     Cancels/No Shows:      Subjective:    Pain:  [] Yes  [x] No Location: R wrist Pain Rating: (0-10 scale) -/10  Pain altered Tx:  [x] No  [] Yes  Action:   Comments: States no pain at arrival. Occasionally has pain with lifting and twisting motions.      Objective:  Modalities: MHP to R wrist post ex - not today  Precautions:  Exercises:   Reps/ Time Weight/ Level Completed Comments   UBE  4'  x    Stretch wrist flex, ext 10x10s   x     Stretch wrist pron/sup 10x10s   x     Stretch PIP,MP,DIP flex 10x10s   x     Stretch PIP,MP,DIP ext 10x10s  x    AROM fingers: fist 15x  x    Finger Abd/Add 15x x    opposition 15x      Thumb flex/abd 15x  x    Towel squeeze 5\" x15      AROM wrist: all dir 15x  x    Pronation/Supination 15x Black Wand+1 weight x    Flex, ext DB x15 each 3# x    Wrist maze x3      Finger web flex/ext 2x10ea      Wrist Circles - fwd/back 15x      Digiflex x15 red x    Clothespin Squeeze 15x ea Red x    Foam Squeeze 15x3\" Blue     Putty Squeeze  x15      Putty Push x15 ea      Tabletop Weight Shifts 20x      Wall Push Up x15      Tband: row, ext, bi/tri x15ea orange     Bicep curl, hammer curl x15 ea 3# x    Manual wrist PROM 15m  x All directions   Other:    Treatment Charges: Mins Units   []  Modalities - MHP     [x]  Ther Exercise 30 2   [x]  Manual Therapy 15 1   []  Ther Activities     []  Aquatics     []  Vasocompression     []  Other     Total Treatment time 45 3       Assessment: [x] Progressing toward goals. Cont with UBE warm up followed by manual. Focused on wrist PROM in each plane as well as thumb joints. Added dumbbell bicep curls to work on elbow strengthening as well as gripping ability. Increased weight for wrist flex/ext. Pt reports feeling more sore with progressions this date. [] No change. [] Other:  [x] Patient would continue to benefit from skilled physical therapy services in order to: Pt limited by R wrist, hand pain, ROM loss, hand, wrist, elbow weakness w/ limitations in functional activties S/P ORIF R distal radius fx. Will initiate wrist, hand passive stretching, ROM exercises    SHORT TERM GOALS ( 8 visits)  Wrist/hand pain = 0  Wrist/hand ROM = WNL  Elbow, wrist, hand strength = 4+/5  Wrist/hand function: , lift, twist w/o pain     LONG TERM GOALS ( 12 visits)  Independent Home Exercise program  Return to normal activity     PATIENT GOAL  Better use of hand    Pt. Education:  [x] Yes  [] No  [x] Reviewed Prior HEP/Ed  Method of Education: [x] Verbal  [] Demo  [x] Written  Comprehension of Education:  [x] Verbalizes understanding.   [x] Demonstrates understanding. [] Needs review. [] Demonstrates/verbalizes HEP/Ed previously given. Plan: [x] Continue current frequency toward long and short term goals.     [x] Specific Instructions for subsequent treatments: cont ROM ex      Time In: 11:00 am         Time Out: 11:48 am    Electronically signed by:  Toro Dangelo PTA

## 2021-07-19 ENCOUNTER — HOSPITAL ENCOUNTER (OUTPATIENT)
Dept: PHYSICAL THERAPY | Facility: CLINIC | Age: 71
Setting detail: THERAPIES SERIES
Discharge: HOME | End: 2021-07-19
Payer: MEDICARE

## 2021-07-19 PROCEDURE — 97140 MANUAL THERAPY 1/> REGIONS: CPT

## 2021-07-19 PROCEDURE — 97110 THERAPEUTIC EXERCISES: CPT

## 2021-07-19 NOTE — FLOWSHEET NOTE
[] [address]  [address]  [phone]  F: [phone] [] [address]  [facility] 36   [address]  P: [phone]  F: [phone] [x] [address] &  Therapy  [address]  P: [phone]  F: [phone] [] [address]  P: [phone]  F: [phone] [] [address]  [facility]   [address]   [place]: [phone]  F: [phone]      Physical Therapy Daily Treatment Note    Date:  2021  Patient Name:  Isiah Garcia    :  1950  MRN: [id number]  Physician:  Yadira [address]: MEDICARE              Eligibility Status:  Eligible     Secondary Insurance (if applicable) [employer]              Eligibility Status: Eligible      DOS: 5.3.21  # of visits allowed/remaining: Based on medical necessity  Source: Website  Spoke To:  [employer]  Reference: [id number]  Medical Diagnosis: S/P ORIF R distal radius fx                    Rehab Codes: S52.501A  Onset Date: 3/3021          Next Dr. Hernandez [place]:   Visit# / total visits: , Progress note for Medicare patient due at visit 20     Cancels/No Shows:      Subjective:    Pain:  [] Yes  [x] No Location: R wrist Pain Rating: (0-10 scale) -/10  Pain altered Tx:  [x] No  [] Yes  Action:   Comments: Continues to deny any pain, only ex stiffness of wrist and thumb. Pt reported her wrist and fingers were sore yesterday from cooking and cutting up food.       Objective:  Modalities: MHP to R wrist post ex - not today  Precautions:  Exercises:   Reps/ Time Weight/ Level Completed Comments   UBE  4'  x    Stretch wrist flex, ext 10x10s   x     Stretch wrist pron/sup 10x10s   x     Stretch PIP,MP,DIP flex 10x10s   x     Stretch Reviewed Prior HEP/Ed  Method of Education: [x] Verbal  [] Demo  [x] Written  Comprehension of Education:  [x] Verbalizes understanding. [x] Demonstrates understanding. [] Needs review. [] Demonstrates/verbalizes HEP/Ed previously given. Plan: [x] Continue current frequency toward long and short term goals. [x] Specific Instructions for subsequent treatments: cont ROM ex      Time In: 4:12 pm         Time Out: 5:01 pm    Electronically signed by:   Eliseo [place]

## 2021-07-22 ENCOUNTER — HOSPITAL ENCOUNTER (OUTPATIENT)
Dept: PHYSICAL THERAPY | Facility: CLINIC | Age: 71
Setting detail: THERAPIES SERIES
Discharge: HOME | End: 2021-07-22
Payer: MEDICARE

## 2021-07-22 PROCEDURE — 97110 THERAPEUTIC EXERCISES: CPT

## 2021-07-22 PROCEDURE — 97140 MANUAL THERAPY 1/> REGIONS: CPT

## 2021-07-22 NOTE — FLOWSHEET NOTE
PIP,MP,DIP flex 10x10s   x     Stretch PIP,MP,DIP ext 10x10s  x    AROM fingers: fist 15x  x    Finger Abd/Add 15x  x    opposition 15x      Thumb flex/abd 15x  x    Towel squeeze 5\" x15      AROM wrist: all dir 15x  x    Pronation/Supination 15x Black Wand+1 weight x    Flex, ext DB x20 each 3# x    Wrist maze x3      Finger web flex/ext 2x10ea      Wrist Circles - fwd/back 15x      Digiflex x15 red x    Clothespin Squeeze 15x ea Red x    Foam Squeeze 15x3\" Blue     Putty Squeeze  x15      Putty Push x15 ea      Tabletop Weight Shifts 20x      Wall Push Up x15      Tband: row, ext, bi/tri x15ea orange     Tband Rad/Ulnar Dev x15 ea Orange band on wall x    Bicep curl, hammer curl x20 ea 3# x    Weighted Rollup Bar 2x 2.5# x    Manual wrist PROM 15m  x All directions   Other:    Treatment Charges: Mins Units   []  Modalities - MHP     [x]  Ther Exercise 30 2   [x]  Manual Therapy 15 1   []  Ther Activities     []  Aquatics     []  Vasocompression     []  Other     Total Treatment time 45 3       Assessment: [x] Progressing toward goals. Continued with exercise program as noted above with good tolerance. Able to add wrist rollups and tband ulnar/radial deviation upon wall to progress ROM and gentle strengthening; no discomfort noted only muscle fatigue. Continued with manual to facilitate ROM improvement and greater capabilities with functional tasks. Stressed the importance of HEP in order to attain set goals with good compliance assessed. [] No change. [] Other:  [x] Patient would continue to benefit from skilled physical therapy services in order to: Pt limited by R wrist, hand pain, ROM loss, hand, wrist, elbow weakness w/ limitations in functional activties S/P ORIF R distal radius fx.  Will initiate wrist, hand passive stretching, ROM exercises    SHORT TERM GOALS ( 8 visits)  Wrist/hand pain = 0  Wrist/hand ROM = WNL  Elbow, wrist, hand strength = 4+/5  Wrist/hand function: , lift, twist w/o pain     LONG TERM GOALS ( 12 visits)  Independent Home Exercise program  Return to normal activity     PATIENT GOAL  Better use of hand    Pt. Education:  [x] Yes  [] No  [x] Reviewed Prior HEP/Ed  Method of Education: [x] Verbal  [] Demo  [x] Written  Comprehension of Education:  [x] Verbalizes understanding. [x] Demonstrates understanding. [] Needs review. [] Demonstrates/verbalizes HEP/Ed previously given. Plan: [x] Continue current frequency toward long and short term goals. [x] Specific Instructions for subsequent treatments: cont ROM ex      Time In: 11:02 am          Time Out: 11:58 am    Electronically signed by:   Tomer Kenny [place]

## 2021-07-26 ENCOUNTER — HOSPITAL ENCOUNTER (OUTPATIENT)
Dept: PHYSICAL THERAPY | Facility: CLINIC | Age: 71
Setting detail: THERAPIES SERIES
Discharge: HOME | End: 2021-07-26
Payer: MEDICARE

## 2021-07-26 PROCEDURE — 97110 THERAPEUTIC EXERCISES: CPT

## 2021-07-26 PROCEDURE — 97140 MANUAL THERAPY 1/> REGIONS: CPT

## 2021-07-26 NOTE — PRE-CERTIFICATION NOTE
Medicare Cap   [x] Physical Therapy  [] Speech Therapy  [] Occupational therapy  *PT and Speech caps combine      $2100 Limit for PT and Speech combined  $2100 Limit for OT individually  At the beginning of the month where you expect to go over $2100, please add the [address] [place] modifier      Patient Name: Brendan Godfreyar: 1950    Note:  This is an estimate of charges billed.      Date of [address] Name # units/ charge $$$ charge Daily Total Charge Ongoing Total $$$   5/3/21 Eval+ther ex  82.82+23.22  106.04   5/7/21 Ther ex 2+man  25.26+19.74+18.45 63.45 169.49   5/10/21 therex2+man   63.45 232.94   5/21 therex2+man   63.45 296.39   5/25 therex3  25.26+19.74+19.74 64.74 361.13   5/28 therex2+man   63.45 424.58   6/2 therex2 + man   63.45 488.03   6/4 therex2+man   63.45 551.48   6/7 3 therex+man  25.26+19.74+19.74+18.45 83.19 634.67   6/11 therex2+man   63.45 698.12   6/14 therex2+man  29.21+22.84+21.34 73.39 771.51   6/18 therex2+man  29.21+22.84+21.34 73.39 844.90   6/22 therex2+man   73.39 918.29   6/25 therex2+man   73.39 991.68   6/28 therex2+man   73.39 1065.08   7/2 therex2+man   73.39 1138.47   7/6 therex2+man   73.39 1211.86   7/9 therex2+man   73.39 1285.25   7/9 therex2+man   73.39 1358.64   7/12 therex1+man   50.55 1409.19   7/16 therex2+man   73.39 1482.58   7/19 therex2+man   73.39 1555.97   7/22 therex2+man   73.39 1629.36   7/26 therex2_man   73.39 1702.75

## 2021-07-26 NOTE — FLOWSHEET NOTE
[] [facility] [address]  [address]  [phone]  F: [phone] [] [address]  [facility] 36   [address]  P: [phone]  F: [phone] [x] [address]  Therapy  [address] [facility]  P: [phone]  F: [phone] [] [address]  P: [phone]  F: [phone] [] [address]  [facility]   [address]   [place]: [phone]  F: [phone]      Physical Therapy Daily Treatment Note    Date:  2021  Patient Name:  Finn Parker    :  1950  MRN: [id number]  Physician:  Yadira [address]: MEDICARE              Eligibility Status:  Eligible     Secondary Insurance (if applicable) [employer]              Eligibility Status: Eligible      DOS: 5.3.21  # of visits allowed/remaining: Based on medical necessity  Source: Website  Spoke To:  [place]  Reference: [id number]  Medical Diagnosis: S/P ORIF R distal radius fx                    Rehab Codes: S52.501A  Onset Date: 3/3021          Next Dr. Lopez Paci:   Visit# / total visits: , Progress note for Medicare patient due at visit 20     Cancels/No Shows:      Subjective:    Pain:  [] Yes  [x] No Location: R wrist Pain Rating: (0-10 scale) -/10  Pain altered Tx:  [x] No  [] Yes  Action:   Comments: Occasionally has pain when lifting objects. Still unable to turn a doorknob.      Objective:  Modalities: MHP to R wrist post ex - not today  Precautions:  Exercises:   Reps/ Time Weight/ Level Completed Comments   UBE  4'  x    Stretch wrist flex, ext 10x10s   x     Stretch wrist pron/sup 10x10s   x     Stretch PIP,MP,DIP flex 10x10s   x     Stretch PIP,MP,DIP ext 10x10s  x    AROM fingers: fist 15x  x    Finger Abd/Add 15x  x opposition 15x      Thumb flex/abd 15x  x    Towel squeeze 5\" x15      AROM wrist: all dir 15x  x    Pronation/Supination 15x Black Wand+1 weight x    Flex, ext DB x20 each 3# x    Wrist maze x3      Finger web flex/ext 2x10ea      Wrist Circles - fwd/back 15x      Digiflex x15 red x    Clothespin Squeeze 15x ea Red x    Foam Squeeze 15x3\" Blue     Putty Squeeze  x15      Putty Push x15 ea      Tabletop Weight Shifts 20x      Wall Push Up x15      Tband: row, ext, bi/tri x15ea orange     Tband Rad/Ulnar Dev x15 ea Orange band on wall x    Bicep curl, hammer curl x20 ea 3# x    Weighted Rollup Bar 2x 2.5# x    Manual wrist PROM 15m  x All directions   Other:    Treatment Charges: Mins Units   []  Modalities - MHP     [x]  Ther Exercise 30 2   [x]  Manual Therapy 15 1   []  Ther Activities     []  Aquatics     []  Vasocompression     []  Other     Total Treatment time 45 3       Assessment: [x] Progressing toward goals. Cont to be limited with ROM. Cues to be more aggressive with her stretches. Encouraged HEP completion 3x per day. [] No change. [] Other:  [x] Patient would continue to benefit from skilled physical therapy services in order to: Pt limited by R wrist, hand pain, ROM loss, hand, wrist, elbow weakness w/ limitations in functional activties S/P ORIF R distal radius fx. Will initiate wrist, hand passive stretching, ROM exercises    SHORT TERM GOALS ( 8 visits)  Wrist/hand pain = 0  Wrist/hand ROM = WNL  Elbow, wrist, hand strength = 4+/5  Wrist/hand function: , lift, twist w/o pain     LONG TERM GOALS ( 12 visits)  Independent Home Exercise program  Return to normal activity     PATIENT GOAL  Better use of hand    Pt. Education:  [x] Yes  [] No  [x] Reviewed Prior HEP/Ed  Method of Education: [x] Verbal  [] Demo  [x] Written  Comprehension of Education:  [x] Verbalizes understanding. [x] Demonstrates understanding. [] Needs review.   [] Demonstrates/verbalizes HEP/Ed previously given.     Plan: [x] Continue current frequency toward long and short term goals.     [x] Specific Instructions for subsequent treatments: cont ROM ex      Time In: 11:00 am          Time Out: 11:50 am    Electronically signed by:  Marianna Yang PTA

## 2021-07-30 ENCOUNTER — HOSPITAL ENCOUNTER (OUTPATIENT)
Dept: PHYSICAL THERAPY | Facility: CLINIC | Age: 71
Setting detail: THERAPIES SERIES
Discharge: HOME | End: 2021-07-30
Payer: MEDICARE

## 2021-07-30 PROCEDURE — 97140 MANUAL THERAPY 1/> REGIONS: CPT

## 2021-07-30 PROCEDURE — 97110 THERAPEUTIC EXERCISES: CPT

## 2021-07-30 NOTE — FLOWSHEET NOTE
[] [facility] [address]  [address]  [phone]  F: [phone] [] [address]  [facility] 36   [address]  P: [phone]  F: [phone] [x] [address]  Therapy  [facility]  P: [phone]  F: [phone] [] [address]  P: [phone]  F: [phone] [] [address]  [facility]   [address]   [place]: [phone]  F: [phone]      Physical Therapy Daily Treatment Note    Date:  2021  Patient Name:  Sergio Bianchi    :  1950  MRN: [id number]  Physician:  [address]: MEDICARE              Eligibility Status: [employer]  Secondary Insurance (if applicable) [employer]              Eligibility Status: Eligible      DOS: 5.3.21  # of visits allowed/remaining: Based on medical necessity  Source: Website  Spoke To:  [employer]  Reference: [id number]  Medical Diagnosis: S/P ORIF R distal radius fx                    Rehab Codes: S52.501A  Onset Date: 3/3021          Next Dr. Dom Godfrey:   Visit# / total visits: , Progress note for Medicare patient due at visit 20     Cancels/No Shows:      Subjective:    Pain:  [x] Yes  [] No Location: R wrist Pain Rating: (0-10 scale) 3-4/10  Pain altered Tx:  [x] No  [] Yes  Action:   Comments: Has been having increased pain in her thumb.      Objective:  Modalities: MHP to R wrist post ex - not today  Precautions:  Exercises:   Reps/ Time Weight/ Level Completed Comments   UBE  4'  x    Stretch wrist flex, ext 10x10s   x     Stretch wrist pron/sup 10x10s   x     Stretch PIP,MP,DIP flex 10x10s   x     Stretch PIP,MP,DIP ext 10x10s  x    AROM fingers: fist 15x  x    Finger Abd/Add 15x  x    opposition 15x      Thumb flex/abd 15x  x    Towel squeeze 5\" x15      AROM wrist: all dir 15x  x    Pronation/Supination 15x Black Wand+1 weight x    Flex, ext DB x20 each 3# x    Wrist maze x3      Finger web flex/ext 2x10ea      Wrist Circles - fwd/back 15x      Digiflex x15 red x    Clothespin Squeeze 15x ea Red x    Foam Squeeze 15x3\" Blue     Putty Squeeze  x15      Putty Push x15 ea      Tabletop Weight Shifts 20x      Wall Push Up x15      Tband: row, ext, bi/tri x15ea orange     Tband Rad/Ulnar Dev x15 ea Orange band on wall x    Bicep curl, hammer curl x20 ea 3# x    Weighted Rollup Bar 2x 2.5# x    Manual wrist PROM 15m  x All directions   Other:    Treatment Charges: Mins Units   []  Modalities - MHP     [x]  Ther Exercise 30 2   [x]  Manual Therapy 15 1   []  Ther Activities     []  Aquatics     []  Vasocompression     []  Other     Total Treatment time 45 3       Assessment: [x] Progressing toward goals. Needs cues to avoid compensatory movements with her shoulder, and for eccentric control during wrist curls. Still having difficulty with thumb ROM. [] No change. [] Other:  [x] Patient would continue to benefit from skilled physical therapy services in order to: Pt limited by R wrist, hand pain, ROM loss, hand, wrist, elbow weakness w/ limitations in functional activties S/P ORIF R distal radius fx. Will initiate wrist, hand passive stretching, ROM exercises    SHORT TERM GOALS ( 8 visits)  Wrist/hand pain = 0  Wrist/hand ROM = WNL  Elbow, wrist, hand strength = 4+/5  Wrist/hand function: , lift, twist w/o pain     LONG TERM GOALS ( 12 visits)  Independent Home Exercise program  Return to normal activity     PATIENT GOAL  Better use of hand    Pt. Education:  [x] Yes  [] No  [x] Reviewed Prior HEP/Ed  Method of Education: [x] Verbal  [] Demo  [x] Written  Comprehension of Education:  [x] Verbalizes understanding. [x] Demonstrates understanding. [] Needs review.   [] Demonstrates/verbalizes HEP/Ed previously given.     Plan: [x] Continue current frequency toward long and short term goals.     [x] Specific Instructions for subsequent treatments: cont ROM ex      Time In: 11:00 am          Time Out: 11:50 am    Electronically signed by:  Rosemary Lemon PTA

## 2021-07-30 NOTE — PRE-CERTIFICATION NOTE
Medicare Cap   [x] Physical Therapy  [] Speech Therapy  [] Occupational therapy  *PT and Speech caps combine      $2100 Limit for PT and Speech combined  $2100 Limit for OT individually  At the beginning of the month where you expect to go over $2100, please add the [address] [place] modifier      Patient Name: Tameka Sanabria: 1950    Note:  This is an estimate of charges billed.      Date of [address] Name # units/ charge $$$ charge Daily Total Charge Ongoing Total $$$   5/3/21 Eval+ther ex  82.82+23.22  106.04   5/7/21 Ther ex 2+man  25.26+19.74+18.45 63.45 169.49   5/10/21 therex2+man   63.45 232.94   5/21 therex2+man   63.45 296.39   5/25 therex3  25.26+19.74+19.74 64.74 361.13   5/28 therex2+man   63.45 424.58   6/2 therex2 + man   63.45 488.03   6/4 therex2+man   63.45 551.48   6/7 3 therex+man  25.26+19.74+19.74+18.45 83.19 634.67   6/11 therex2+man   63.45 698.12   6/14 therex2+man  29.21+22.84+21.34 73.39 771.51   6/18 therex2+man  29.21+22.84+21.34 73.39 844.90   6/22 therex2+man   73.39 918.29   6/25 therex2+man   73.39 991.68   6/28 therex2+man   73.39 1065.08   7/2 therex2+man   73.39 1138.47   7/6 therex2+man   73.39 1211.86   7/9 therex2+man   73.39 1285.25   7/9 therex2+man   73.39 1358.64   7/12 therex1+man   50.55 1409.19   7/16 therex2+man   73.39 1482.58   7/19 therex2+man   73.39 1555.97   7/22 therex2+man   73.39 1629.36   7/26 therex2_man   73.39 1702.75   7/30 therex2+man   73.39 1776.14

## 2021-08-02 ENCOUNTER — HOSPITAL ENCOUNTER (OUTPATIENT)
Dept: PHYSICAL THERAPY | Facility: CLINIC | Age: 71
Setting detail: THERAPIES SERIES
Discharge: HOME | End: 2021-08-02
Payer: MEDICARE

## 2021-08-02 PROCEDURE — 97140 MANUAL THERAPY 1/> REGIONS: CPT

## 2021-08-02 PROCEDURE — 97110 THERAPEUTIC EXERCISES: CPT

## 2021-08-02 NOTE — PRE-CERTIFICATION NOTE
Medicare Cap   [x] Physical Therapy  [] Speech Therapy  [] Occupational therapy  *PT and Speech caps combine      $2100 Limit for PT and Speech combined  $2100 Limit for OT individually  At the beginning of the month where you expect to go over $2100, please add the [address] [place] 22 modifier      Patient Name: Maira Feng: 1950    Note:  This is an estimate of charges billed.      Date of [address] Name # units/ charge $$$ charge Daily Total Charge Ongoing Total $$$   5/3/21 Eval+ther ex  82.82+23.22  106.04   5/7/21 Ther ex 2+man  25.26+19.74+18.45 63.45 169.49   5/10/21 therex2+man   63.45 232.94   5/21 therex2+man   63.45 296.39   5/25 therex3  25.26+19.74+19.74 64.74 361.13   5/28 therex2+man   63.45 424.58   6/2 therex2 + man   63.45 488.03   6/4 therex2+man   63.45 551.48   6/7 3 therex+man  25.26+19.74+19.74+18.45 83.19 634.67   6/11 therex2+man   63.45 698.12   6/14 therex2+man  29.21+22.84+21.34 73.39 771.51   6/18 therex2+man  29.21+22.84+21.34 73.39 844.90   6/22 therex2+man   73.39 918.29   6/25 therex2+man   73.39 991.68   6/28 therex2+man   73.39 1065.08   7/2 therex2+man   73.39 1138.47   7/6 therex2+man   73.39 1211.86   7/9 therex2+man   73.39 1285.25   7/9 therex2+man   73.39 1358.64   7/12 therex1+man   50.55 1409.19   7/16 therex2+man   73.39 1482.58   7/19 therex2+man   73.39 1555.97   7/22 therex2+man   73.39 1629.36   7/26 therex2_man   73.39 1702.75   7/30 therex2+man   73.39 1776.14   8/2 therex2+man   73.39 1849.53

## 2021-08-02 NOTE — FLOWSHEET NOTE
[facility]. [address] [place] [address]  [address], [facility] 36.  Phone: [phone]  Fax:     [phone]    Physical Therapy Progress Report    Date:  2021  Patient: Finn Parker  : 1950  MRN: [id number]  Physician: [facility]: MEDICARE              Eligibility Status:  Eligible     Secondary Insurance (if applicable) [employer]              Eligibility Status: Eligible      DOS: 5.3.21  # of visits allowed/remaining: Based on medical necessity  Source: Website  Spoke To:  [place]  Reference: [id number]  Medical Diagnosis: S/P ORIF R distal radius fx    Rehab Codes: S52.501A  Onset Date: 3/3021                                   Subjective:  Pt report decreased R wrist pain, feels her motion is improving, cont to note most limitation w/ gripping activities, especially when using her thumb    Pain:  [x] Yes  [] No Pain Rating: (0-10 scale) 2/10    Objective:  Upper Extremity Functional Scale:  Usual work, household, school activities  3  Usual hobbies, recreational activities, sports  4  Lifting bag of groceries to waist level   2  Lifting bag of groceries to over head   1  Grooming hair      3  Pushing with your hands    2  Preparing food     3  Driving       4  Vacuuming, sweeping, or raking   3  Getting dressed     3  Doing up buttons     4  Using tools or appliances    4  Opening doors     3  Cleaning       3  Tying shoelaces     4  Sleeping      4  Doing laundry      3  Opening a jar      1  Throwing a ball     3  Carrying small suitcase    3  Total       60    ASSESSMENT   Pt cont to have limitations in heavier gripping, lifting, twisting activities of R hand/wrist, Recommend cont PT for further ROM, strengthening.     PROBLEMS  Wrist/hand pain  Wrist/hand ROM loss  Elbow, wrist, hand  weakness  Wrist/hand functional deficits    SHORT TERM GOALS ( 8 visits)  Wrist/hand pain = 0  Wrist/hand ROM = WNL  Elbow, wrist, hand strength = 4+/5  Wrist/hand function: , lift, twist w/o pain    LONG TERM GOALS ( 12 visits)  Independent Home Exercise program  Return to normal activity    Rehab Potential:  [x] Good  [] Fair  [] Poor   Suggested Professional Referral:  [x] No  [] Yes:  Barriers to Goal Achievement[de-identified]  [x] No  [] Yes:  Domestic Concerns:  [x] No  [] Yes:    Pt. Education:  [x] Plans/Goals, Risks/Benefits discussed  [x] Home exercise program  Method of Education: [x] Verbal  [x] Demo  [x] Written  Comprehension of Education:  [x] Verbalizes understanding. [x] Demonstrates understanding. [] Needs Review. [] Demonstrates/verbalizes understanding of HEP/Ed previously given. Treatment Plan:  [x] Therapeutic Exercise    [] Modalities:  [] Therapeutic Activity    [] Ultrasound  [] Electrical Stimulation  [] Gait Training     [x] Massage       [] Lumbar/Cervical Traction  [] Neuromuscular Re-education [x] Cold/hotpack [] Instruction in HEP  [x] Manual Therapy   [] Aquatic Therapy [] Other:     [] Iontophoresis: 4 mg/mL Dexamethasone Sodium Phosphate 40-80 mAmin  [] Drug allergies reviewed    _______Initials           _______Date     Frequency:  2 x/week for 12 visits      Time in: 1500     Time out: 1600    Electronically signed by: Deandra Bradford PT        Physician Signature:________________________________Date:__________________  By signing above, I have reviewed this plan of care and certify a need for medically   necessary rehabilitation services.      *PLEASE SIGN ABOVE AND FAX BACK ALL PAGES*

## 2021-08-02 NOTE — FLOWSHEET NOTE
[] [address]  [address]  [phone]  F: [phone] [] [address]  [facility] 36   [address]  P: [phone]  F: [phone] [x] [address]  Therapy  [facility]  P: [phone]  F: [phone] [] [address]  P: [phone]  F: [phone] [] [address]  [facility]   [address]   [place]: [phone]  F: [phone]      Physical Therapy Daily Treatment Note    Date:  2021  Patient Name:  Jeffrey Martinez    :  1950  MRN: [id number]  Physician:  Yadira [address]: MEDICARE              Eligibility Status:  Eligible     Secondary Insurance (if applicable) [employer]              Eligibility Status: Eligible      DOS: 5.3.21  # of visits allowed/remaining: Based on medical necessity  Source: Website  Spoke To:  [employer]  Reference: [id number]  Medical Diagnosis: S/P ORIF R distal radius fx                    Rehab Codes: S52.501A  Onset Date: 3/3021          Next Dr. Paula Dy:   Visit# / total visits:  , Progress note for Medicare patient due at visit 28     Cancels/No Shows:      Subjective:    Pain:  [] Yes  [x] No Location: R wrist Pain Rating: (0-10 scale) 3-4/10  Pain altered Tx:  [x] No  [] Yes  Action:   Comments: Has noticed significant improvement in her thumb ROM since spending extra time on it over the weekend. Able to carry her coffee cup with her R hand now.      Objective:  Modalities: MHP to R wrist post ex - not today  Precautions:  Exercises:   Reps/ Time Weight/ Level Completed Comments   UBE  4'  x    Stretch wrist flex, ext 10x10s   x     Stretch wrist pron/sup 10x10s   x     Stretch PIP,MP,DIP flex 10x10s   x     Stretch PIP,MP,DIP ext 10x10s  x    AROM fingers: fist 15x  x    Finger Abd/Add 15x  x    opposition 15x      Thumb flex/abd 15x  x    Towel squeeze 5\" x15      AROM wrist: all dir 15x  x    Pronation/Supination 15x Black Wand+1 weight x    Flex, ext DB x20 each 3# x    Wrist maze x3      Finger web flex/ext 2x10ea      Wrist Circles - fwd/back 15x      Digiflex x15 red x    Clothespin Squeeze 15x ea Red x    Foam Squeeze 15x3\" Blue     Putty Squeeze  x15      Putty Push x15 ea      Tabletop Weight Shifts 20x      Wall Push Up x15      Tband: row, ext, bi/tri x15ea orange     Tband Rad/Ulnar Dev x15 ea Orange band on wall x    Bicep curl, hammer curl x25 ea 3# x    Weighted Rollup Bar 2x 2.5# x    Manual wrist PROM 15m  x All directions   Other:    Treatment Charges: Mins Units   []  Modalities - MHP     [x]  Ther Exercise 30 2   [x]  Manual Therapy 15 1   []  Ther Activities     []  Aquatics     []  Vasocompression     []  Other     Total Treatment time 45 3       Assessment: [x] Progressing toward goals. Increased reps for some of the ex, fatigues quickly. Cont to need cues for reduced compensatory movements with her shoulder. Cues with wrist curls for eccentric lowering, and during wall push up for equal WB through UE. Sore with progressions this date. Cont to progress per tolerance. [] No change. [] Other:  [x] Patient would continue to benefit from skilled physical therapy services in order to: Pt limited by R wrist, hand pain, ROM loss, hand, wrist, elbow weakness w/ limitations in functional activties S/P ORIF R distal radius fx. Will initiate wrist, hand passive stretching, ROM exercises    SHORT TERM GOALS ( 8 visits)  Wrist/hand pain = 0  Wrist/hand ROM = WNL  Elbow, wrist, hand strength = 4+/5  Wrist/hand function: , lift, twist w/o pain     LONG TERM GOALS ( 12 visits)  Independent Home Exercise program  Return to normal activity     PATIENT GOAL  Better use of hand    Pt.  Education:  [x] Yes  [] No [x] Reviewed Prior HEP/Ed  Method of Education: [x] Verbal  [] Demo  [] Written  Comprehension of Education:  [x] Verbalizes understanding. [x] Demonstrates understanding. [] Needs review. [] Demonstrates/verbalizes HEP/Ed previously given. Plan: [x] Continue current frequency toward long and short term goals.     [x] Specific Instructions for subsequent treatments: cont ROM ex      Time In: 3:00 pm        Time Out: 3:45 pm    Electronically signed by:  Cresencio Rojas PTA

## 2021-08-06 ENCOUNTER — HOSPITAL ENCOUNTER (OUTPATIENT)
Dept: PHYSICAL THERAPY | Facility: CLINIC | Age: 71
Setting detail: THERAPIES SERIES
Discharge: HOME | End: 2021-08-06
Payer: MEDICARE

## 2021-08-06 PROCEDURE — 97140 MANUAL THERAPY 1/> REGIONS: CPT

## 2021-08-06 PROCEDURE — 97110 THERAPEUTIC EXERCISES: CPT

## 2021-08-06 NOTE — FLOWSHEET NOTE
[] [facility]  [address]  [phone]  F: [phone] [] [address]  [facility] 36   [address]  P: [phone]  F: [phone] [x] [address]  Therapy  [facility]  P: [phone]  F: [phone] [] [address]  P: [phone]  F: [phone] [] [address]  [facility]   [address]   [place]: [phone]  F: [phone]      Physical Therapy Daily Treatment Note    Date:  2021  Patient Name:  Abigail Monroy    :  1950  MRN: [id number]  Physician:  Yadira [address]: MEDICARE              Eligibility Status:  Eligible     Secondary Insurance (if applicable) [employer]              Eligibility Status: Eligible      DOS: 5.3.21  # of visits allowed/remaining: Based on medical necessity  Source: Website  Spoke To:  [employer]  Reference: [id number]  Medical Diagnosis: S/P ORIF R distal radius fx                    Rehab Codes: S52.501A  Onset Date: 3/3021          Next Dr. Aixa Ny:   Visit# / total visits:  , Progress note for Medicare patient due at visit 28     Cancels/No Shows:      Subjective:    Pain:  [] Yes  [x] No Location: R wrist Pain Rating: (0-10 scale) -/10  Pain altered Tx:  [x] No  [] Yes  Action:   Comments: Pt reports no pain at arrival just cont stiffness of wrist/thumb.      Objective:  Modalities: MHP to R wrist post ex - not today  Precautions:  Exercises:   Reps/ Time Weight/ Level Completed Comments   UBE  4'      Stretch wrist flex, ext 10x10s   x     Stretch wrist pron/sup 10x10s   x     Stretch PIP,MP,DIP flex 10x10s   x     Stretch PIP,MP,DIP ext 10x10s  x    AROM fingers: fist 20x  x    Finger Abd/Add 20x  x    opposition 15x Thumb flex/abd 20x  x    Towel squeeze 5\" x15      AROM wrist: all dir 15x      Pronation/Supination 20x Black Wand+1 weight x    Flex, ext DB x20 each 3# x    Wrist maze x3  x    Finger web flex/ext 2x10ea      Wrist Circles - fwd/back 15x      Digiflex x20 red x    Clothespin Squeeze x20 Red x    Foam Squeeze 15x3\" Blue     Putty Squeeze  x15      Putty Push x15 ea      Tabletop Weight Shifts 20x      Wall Push Up x20  x    Tband: row, ext, bi/tri x15ea orange     Tband Rad/Ulnar Dev x20ea Orange band on wall x    Bicep curl, hammer curl x25 ea 3# x    Weighted Rollup Bar 3x 2.5# x    Manual wrist PROM 15m  x All directions   Other:    Treatment Charges: Mins Units   []  Modalities - MHP     [x]  Ther Exercise 30 2   [x]  Manual Therapy 15 1   []  Ther Activities     []  Aquatics     []  Vasocompression     []  Other     Total Treatment time 45 3       Assessment: [x] Progressing toward goals. Increased reps for most ex this date. Improved mobility in her thumb however still not WNL. Instructed to be more aggressive with her HEP and stretches at home. Encouraged use of her R UE as much as possible during the day. [] No change. [] Other:  [x] Patient would continue to benefit from skilled physical therapy services in order to: Pt limited by R wrist, hand pain, ROM loss, hand, wrist, elbow weakness w/ limitations in functional activties S/P ORIF R distal radius fx. Will initiate wrist, hand passive stretching, ROM exercises    SHORT TERM GOALS ( 8 visits)  Wrist/hand pain = 0  Wrist/hand ROM = WNL  Elbow, wrist, hand strength = 4+/5  Wrist/hand function: , lift, twist w/o pain     LONG TERM GOALS ( 12 visits)  Independent Home Exercise program  Return to normal activity     PATIENT GOAL  Better use of hand    Pt. Education:  [x] Yes  [] No  [x] Reviewed Prior HEP/Ed  Method of Education: [x] Verbal  [] Demo  [] Written  Comprehension of Education:  [x] Verbalizes understanding.   [x] Demonstrates understanding. [] Needs review. [] Demonstrates/verbalizes HEP/Ed previously given. Plan: [x] Continue current frequency toward long and short term goals.     [x] Specific Instructions for subsequent treatments: cont ROM ex      Time In: 11:05 am      Time Out: 11:50 am    Electronically signed by:  Pamela Leon PTA

## 2021-08-06 NOTE — PRE-CERTIFICATION NOTE
Medicare Cap   [x] Physical Therapy  [] Speech Therapy  [] Occupational therapy  *PT and Speech caps combine      $2100 Limit for PT and Speech combined  $2100 Limit for OT individually  At the beginning of the month where you expect to go over $2100, please add the [address] [place] [address] modifier      Patient Name: Sherif Damian: 1950    Note:  This is an estimate of charges billed.      Date of [address] Name # units/ charge $$$ charge Daily Total Charge Ongoing Total $$$   5/3/21 Eval+ther ex  82.82+23.22  106.04   5/7/21 Ther ex 2+man  25.26+19.74+18.45 63.45 169.49   5/10/21 therex2+man   63.45 232.94   5/21 therex2+man   63.45 296.39   5/25 therex3  25.26+19.74+19.74 64.74 361.13   5/28 therex2+man   63.45 424.58   6/2 therex2 + man   63.45 488.03   6/4 therex2+man   63.45 551.48   6/7 3 therex+man  25.26+19.74+19.74+18.45 83.19 634.67   6/11 therex2+man   63.45 698.12   6/14 therex2+man  29.21+22.84+21.34 73.39 771.51   6/18 therex2+man  29.21+22.84+21.34 73.39 844.90   6/22 therex2+man   73.39 918.29   6/25 therex2+man   73.39 991.68   6/28 therex2+man   73.39 1065.08   7/2 therex2+man   73.39 1138.47   7/6 therex2+man   73.39 1211.86   7/9 therex2+man   73.39 1285.25   7/9 therex2+man   73.39 1358.64   7/12 therex1+man   50.55 1409.19   7/16 therex2+man   73.39 1482.58   7/19 therex2+man   73.39 1555.97   7/22 therex2+man   73.39 1629.36   7/26 therex2_man   73.39 1702.75   7/30 therex2+man   73.39 1776.14   8/2 therex2+man   73.39 1849.53   8/6 therex2+man   73.39 1922.92

## 2021-08-09 ENCOUNTER — HOSPITAL ENCOUNTER (OUTPATIENT)
Dept: PHYSICAL THERAPY | Facility: CLINIC | Age: 71
Setting detail: THERAPIES SERIES
Discharge: HOME | End: 2021-08-09
Payer: MEDICARE

## 2021-08-09 PROCEDURE — 97110 THERAPEUTIC EXERCISES: CPT

## 2021-08-09 PROCEDURE — 97140 MANUAL THERAPY 1/> REGIONS: CPT

## 2021-08-09 NOTE — FLOWSHEET NOTE
[] [facility] [address]  [address]  [phone]  F: [phone] [] [address]  [facility] 36   [address]  P: [phone]  F: [phone] [x] [address]  Therapy  [facility]  P: [phone]  F: [phone] [] [address]  P: [phone]  F: [phone] [] [address]  [facility]   [address]   [place]: [phone]  F: [phone]      Physical Therapy Daily Treatment Note    Date:  2021  Patient Name:  Haseeb Irvin    :  1950  MRN: [id number]  Physician:  Yadira [address]: MEDICARE              Eligibility Status:  Eligible     Secondary Insurance (if applicable) [employer]              Eligibility Status: Eligible      DOS: 5.3.21  # of visits allowed/remaining: Based on medical necessity  Source: Website  Spoke To:  [employer]  Reference: [id number]  Medical Diagnosis: S/P ORIF R distal radius fx                    Rehab Codes: S52.501A  Onset Date: 3/3021          Next Dr. Kostas Ambrosio:   Visit# / total visits:  , Progress note for Medicare patient due at visit 28     Cancels/No Shows:      Subjective:    Pain:  [] Yes  [x] No Location: R wrist Pain Rating: (0-10 scale) -/10  Pain altered Tx:  [x] No  [] Yes  Action:   Comments: Pt reports no pain at arrival just cont stiffness of wrist/thumb. States opening a doorknob is getting easier.     Objective:  Modalities: MHP to R wrist post ex - not today  Precautions:  Exercises:   Reps/ Time Weight/ Level Completed Comments   UBE  4'      Stretch wrist flex, ext 10x10s   x     Stretch wrist pron/sup 10x10s   x     Stretch PIP,MP,DIP flex 10x10s   x     Stretch PIP,MP,DIP ext 10x10s  x    AROM fingers: fist 20x x    Finger Abd/Add 20x  x    opposition 15x      Thumb flex/abd 20x  x    Towel squeeze 5\" x15      AROM wrist: all dir 15x      Pronation/Supination 20x Black Wand+1 weight x    Flex, ext DB 2x10 4#, 3# x dropset   Wrist maze x3  x    Finger web flex/ext 2x10ea      Wrist Circles - fwd/back 15x      Digiflex x20 green x    Clothespin Squeeze x20 green x    Foam Squeeze 15x3\" Blue     Putty Squeeze  x15      Putty Push x15 ea      Tabletop Weight Shifts 20x      Wall Push Up x20  x    Tband: row, ext, bi/tri x15ea orange     Tband Rad/Ulnar Dev x20ea Orange band on wall     Bicep curl, hammer curl x20 ea 4# x    Weighted Rollup Bar 3x 2.5# x    Manual wrist PROM x  x All directions   Other:    Treatment Charges: Mins Units   []  Modalities - MHP     [x]  Ther Exercise 30 2   [x]  Manual Therapy 12 1   []  Ther Activities     []  Aquatics     []  Vasocompression     []  Other     Total Treatment time 42 3       Assessment: [x] Progressing toward goals. Increased resistance for digiflex, clothespin squeeze, wrist curls, and biceps curls. Sore with progressions but able to complete with proper technique. Reminded pt to be using her RUE dominantly at home. [] No change. [] Other:  [x] Patient would continue to benefit from skilled physical therapy services in order to: Pt limited by R wrist, hand pain, ROM loss, hand, wrist, elbow weakness w/ limitations in functional activties S/P ORIF R distal radius fx. Will initiate wrist, hand passive stretching, ROM exercises    SHORT TERM GOALS ( 8 visits)  Wrist/hand pain = 0  Wrist/hand ROM = WNL  Elbow, wrist, hand strength = 4+/5  Wrist/hand function: , lift, twist w/o pain     LONG TERM GOALS ( 12 visits)  Independent Home Exercise program  Return to normal activity     PATIENT GOAL  Better use of hand    Pt.  Education:  [x] Yes  [] No  [x] Reviewed Prior HEP/Ed  Method of Education: [x] Verbal  [] Demo  [] Written  Comprehension of Education:  [x] Ari Messing understanding. [x] Demonstrates understanding. [] Needs review. [] Demonstrates/verbalizes HEP/Ed previously given. Plan: [x] Continue current frequency toward long and short term goals.     [x] Specific Instructions for subsequent treatments: cont ROM ex      Time In: 11:00 am      Time Out: 11:50 am    Electronically signed by:  Anton Medellin PTA

## 2021-08-09 NOTE — PRE-CERTIFICATION NOTE
Medicare Cap   [x] Physical Therapy  [] Speech Therapy  [] Occupational therapy  *PT and Speech caps combine      $2100 Limit for PT and Speech combined  $2100 Limit for OT individually  At the beginning of the month where you expect to go over $2100, please add the [address] [place] modifier      Patient Name: Victor Manuel Melendez: 1950    Note:  This is an estimate of charges billed.      Date of [address] Name # units/ charge $$$ charge Daily Total Charge Ongoing Total $$$   5/3/21 Eval+ther ex  82.82+23.22  106.04   5/7/21 Ther ex 2+man  25.26+19.74+18.45 63.45 169.49   5/10/21 therex2+man   63.45 232.94   5/21 therex2+man   63.45 296.39   5/25 therex3  25.26+19.74+19.74 64.74 361.13   5/28 therex2+man   63.45 424.58   6/2 therex2 + man   63.45 488.03   6/4 therex2+man   63.45 551.48   6/7 3 therex+man  25.26+19.74+19.74+18.45 83.19 634.67   6/11 therex2+man   63.45 698.12   6/14 therex2+man  29.21+22.84+21.34 73.39 771.51   6/18 therex2+man  29.21+22.84+21.34 73.39 844.90   6/22 therex2+man   73.39 918.29   6/25 therex2+man   73.39 991.68   6/28 therex2+man   73.39 1065.08   7/2 therex2+man   73.39 1138.47   7/6 therex2+man   73.39 1211.86   7/9 therex2+man   73.39 1285.25   7/9 therex2+man   73.39 1358.64   7/12 therex1+man   50.55 1409.19   7/16 therex2+man   73.39 1482.58   7/19 therex2+man   73.39 1555.97   7/22 therex2+man   73.39 1629.36   7/26 therex2_man   73.39 1702.75   7/30 therex2+man   73.39 1776.14   8/2 therex2+man   73.39 1849.53   8/6 therex2+man   73.39 1922.92   8/9/21 therex2+man   73.39 6789.04

## 2021-08-13 ENCOUNTER — HOSPITAL ENCOUNTER (OUTPATIENT)
Dept: PHYSICAL THERAPY | Facility: CLINIC | Age: 71
Setting detail: THERAPIES SERIES
Discharge: HOME | End: 2021-08-13
Payer: MEDICARE

## 2021-08-13 PROCEDURE — 97110 THERAPEUTIC EXERCISES: CPT

## 2021-08-13 PROCEDURE — 97140 MANUAL THERAPY 1/> REGIONS: CPT

## 2021-08-13 NOTE — FLOWSHEET NOTE
[] [address]  [address]  [phone]  F: [phone] [] [address]  [facility] 36   [address]  P: [phone]  F: [phone] [x] [address]  Therapy  [facility]  P: [phone]  F: [phone] [] [address]  P: [phone]  F: [phone] [] [address]  [facility]   [address]   [place]: [phone]  F: [phone]      Physical Therapy Daily Treatment Note    Date:  2021  Patient Name:  Haseeb Bai    :  1950  MRN: [id number]  Physician:  Yadira [address]: MEDICARE              Eligibility Status:  Eligible     Secondary Insurance (if applicable) [employer]              Eligibility Status: Eligible      DOS: 5.3.21  # of visits allowed/remaining: Based on medical necessity  Source: Website  Spoke To:  [employer]  Reference: [id number]  Medical Diagnosis: S/P ORIF R distal radius fx                    Rehab Codes: S52.501A  Onset Date: 3/3021          Next Dr. Richard Rao:   Visit# / total visits:  , Progress note for Medicare patient due at visit 28     Cancels/No Shows:      Subjective:    Pain:  [] Yes  [x] No Location: R wrist Pain Rating: (0-10 scale) -/10  Pain altered Tx:  [x] No  [] Yes  Action:   Comments: Still has some things at home she cant do but overall feels she has better function.      Objective:  Modalities: MHP to R wrist post ex - not today  Precautions:  Exercises:   Reps/ Time Weight/ Level Completed Comments   UBE  4'      Stretch wrist flex, ext 10x10s   x     Stretch wrist pron/sup 10x10s   x     Stretch PIP,MP,DIP flex 10x10s   x     Stretch PIP,MP,DIP ext 10x10s  x    AROM fingers: fist 20x  x    Finger Abd/Add 20x x    opposition 15x      Thumb flex/abd 20x  x    Towel squeeze 5\" x15      AROM wrist: all dir 15x      Pronation/Supination 20x Black Wand+2 weight x    Flex, ext DB x15 ea 4# x    Wrist maze x3  x    Finger web flex/ext 2x10ea      Wrist Circles - fwd/back 15x      Digiflex x20 green x    Clothespin Squeeze x20 green x    Foam Squeeze 15x3\" Blue     Putty Squeeze  x15      Putty Push x15 ea      Tabletop Weight Shifts 20x      Wall Push Up x20  x    Tband: row, ext, bi/tri x15ea orange     Tband Rad/Ulnar Dev x20ea Orange band on wall     Bicep curl, hammer curl x20 ea 4# x    Weighted Rollup Bar 3x 2.5# x    Manual wrist PROM x  x All directions   Other:    Treatment Charges: Mins Units   []  Modalities - MHP     [x]  Ther Exercise 27 2   [x]  Manual Therapy 12 1   []  Ther Activities     []  Aquatics     []  Vasocompression     []  Other     Total Treatment time 39 3       Assessment: [x] Progressing toward goals. Still has limited motion in her wrist however has improved significantly. Cues needed to be more aggressive with her stretches and to not be afraid to use her hand at home for normal tasks. Able to increase weight for pronation/supination. Plan for D/C to HEP at next visit with pt in agreement. [] No change. [] Other:  [x] Patient would continue to benefit from skilled physical therapy services in order to: Pt limited by R wrist, hand pain, ROM loss, hand, wrist, elbow weakness w/ limitations in functional activties S/P ORIF R distal radius fx. Will initiate wrist, hand passive stretching, ROM exercises    SHORT TERM GOALS ( 8 visits)  Wrist/hand pain = 0  Wrist/hand ROM = WNL  Elbow, wrist, hand strength = 4+/5  Wrist/hand function: , lift, twist w/o pain     LONG TERM GOALS ( 12 visits)  Independent Home Exercise program  Return to normal activity     PATIENT GOAL  Better use of hand    Pt.  Education:  [x] Yes  [] No  [x] Reviewed Prior HEP/Ed  Method of Education: [x] Verbal  [] [facility] [] Written  Comprehension of Education:  [x] Verbalizes understanding. [x] Demonstrates understanding. [] Needs review. [] Demonstrates/verbalizes HEP/Ed previously given. Plan: [x] Continue current frequency toward long and short term goals.     [x] Specific Instructions for subsequent treatments: cont ROM ex      Time In: 11:00 am      Time Out: 11:40 am    Electronically signed by:  Roe Keenan PTA

## 2021-08-13 NOTE — PRE-CERTIFICATION NOTE
Medicare Cap   [x] Physical Therapy  [] Speech Therapy  [] Occupational therapy  *PT and Speech caps combine      $2100 Limit for PT and Speech combined  $2100 Limit for OT individually  At the beginning of the month where you expect to go over $2100, please add the [address] [place] modifier      Patient Name: Tameka Sanabria: 1950    Note:  This is an estimate of charges billed.      Date of [address] Name # units/ charge $$$ charge Daily Total Charge Ongoing Total $$$   5/3/21 Eval+ther ex  82.82+23.22  106.04   5/7/21 Ther ex 2+man  25.26+19.74+18.45 63.45 169.49   5/10/21 therex2+man   63.45 232.94   5/21 therex2+man   63.45 296.39   5/25 therex3  25.26+19.74+19.74 64.74 361.13   5/28 therex2+man   63.45 424.58   6/2 therex2 + man   63.45 488.03   6/4 therex2+man   63.45 551.48   6/7 3 therex+man  25.26+19.74+19.74+18.45 83.19 634.67   6/11 therex2+man   63.45 698.12   6/14 therex2+man  29.21+22.84+21.34 73.39 771.51   6/18 therex2+man  29.21+22.84+21.34 73.39 844.90   6/22 therex2+man   73.39 918.29   6/25 therex2+man   73.39 991.68   6/28 therex2+man   73.39 1065.08   7/2 therex2+man   73.39 1138.47   7/6 therex2+man   73.39 1211.86   7/9 therex2+man   73.39 1285.25   7/9 therex2+man   73.39 1358.64   7/12 therex1+man   50.55 1409.19   7/16 therex2+man   73.39 1482.58   7/19 therex2+man   73.39 1555.97   7/22 therex2+man   73.39 1629.36   7/26 therex2_man   73.39 1702.75   7/30 therex2+man   73.39 1776.14   8/2 therex2+man   73.39 1849.53   8/6 therex2+man   73.39 1922.92   8/9/21 therex2+man   73.39 1996.32   8/9/21 therex2+man   73.39 4484.94

## 2021-08-17 ENCOUNTER — APPOINTMENT (OUTPATIENT)
Dept: PHYSICAL THERAPY | Facility: CLINIC | Age: 71
End: 2021-08-17
Payer: MEDICARE

## 2021-08-20 ENCOUNTER — HOSPITAL ENCOUNTER (OUTPATIENT)
Dept: PHYSICAL THERAPY | Facility: CLINIC | Age: 71
Setting detail: THERAPIES SERIES
Discharge: HOME | End: 2021-08-20
Payer: MEDICARE

## 2021-08-20 PROCEDURE — 97110 THERAPEUTIC EXERCISES: CPT

## 2021-08-20 PROCEDURE — 97140 MANUAL THERAPY 1/> REGIONS: CPT

## 2021-08-20 NOTE — FLOWSHEET NOTE
Thumb flex/abd 20x  x    Towel squeeze 5\" x15      AROM wrist: all dir 15x      Pronation/Supination 20x Black Wand+2 weight x    Flex, ext DB x15 ea 4# x    Wrist maze x3  x    Finger web flex/ext 2x10ea      Wrist Circles - fwd/back 15x      Digiflex x20 green x    Clothespin Squeeze x20 green x    Foam Squeeze 15x3\" Blue     Putty Squeeze  x15      Putty Push x15 ea      Tabletop Weight Shifts 20x      Wall Push Up x20  x    Tband: row, ext, bi/tri x15ea orange     Tband Rad/Ulnar Dev x20ea Orange band on wall     Bicep curl, hammer curl x20 ea 4# x    Weighted Rollup Bar 3x 2.5#     Manual wrist PROM x  x All directions   Other:    Treatment Charges: Mins Units   []  Modalities - MHP     [x]  Ther Exercise 25 2   [x]  Manual Therapy 15 1   []  Ther Activities     []  Aquatics     []  Vasocompression     []  Other     Total Treatment time 40 3       Assessment: [x] Progressing toward goals. Overall has improved mobility and  strength allowing her to have greater ease completing ADL's. Has been completing HEP everyday without issue. At this point, plan for D/C to independent HEP with pt in agreement. [] No change. [] Other:  [x] Patient would continue to benefit from skilled physical therapy services in order to: Pt limited by R wrist, hand pain, ROM loss, hand, wrist, elbow weakness w/ limitations in functional activties S/P ORIF R distal radius fx. Will initiate wrist, hand passive stretching, ROM exercises    SHORT TERM GOALS ( 8 visits)  Wrist/hand pain = 0  Wrist/hand ROM = WNL  Elbow, wrist, hand strength = 4+/5  Wrist/hand function: , lift, twist w/o pain     LONG TERM GOALS ( 12 visits)  Independent Home Exercise program  Return to normal activity     PATIENT GOAL  Better use of hand    Pt. Education:  [x] Yes  [] No  [x] Reviewed Prior HEP/Ed  Method of Education: [x] Verbal  [] Demo  [] Written  Comprehension of Education:  [x] Verbalizes understanding.   [x] Demonstrates understanding. [] Needs review. [] Demonstrates/verbalizes HEP/Ed previously given.      Plan: [x] D/C to HEP      Time In: 11:00 am      Time Out: 11:40 am    Electronically signed by:  Roe Keenan PTA

## 2021-08-20 NOTE — PRE-CERTIFICATION NOTE
Medicare Cap   [x] Physical Therapy  [] Speech Therapy  [] Occupational therapy  *PT and Speech caps combine      $2100 Limit for PT and Speech combined  $2100 Limit for OT individually  At the beginning of the month where you expect to go over $2100, please add the [address] [place] modifier      Patient Name: Yesica Foley: 1950    Note:  This is an estimate of charges billed.      Date of [address] Name # units/ charge $$$ charge Daily Total Charge Ongoing Total $$$   5/3/21 Eval+ther ex  82.82+23.22  106.04   5/7/21 Ther ex 2+man  25.26+19.74+18.45 63.45 169.49   5/10/21 therex2+man   63.45 232.94   5/21 therex2+man   63.45 296.39   5/25 therex3  25.26+19.74+19.74 64.74 361.13   5/28 therex2+man   63.45 424.58   6/2 therex2 + man   63.45 488.03   6/4 therex2+man   63.45 551.48   6/7 3 therex+man  25.26+19.74+19.74+18.45 83.19 634.67   6/11 therex2+man   63.45 698.12   6/14 therex2+man  29.21+22.84+21.34 73.39 771.51   6/18 therex2+man  29.21+22.84+21.34 73.39 844.90   6/22 therex2+man   73.39 918.29   6/25 therex2+man   73.39 991.68   6/28 therex2+man   73.39 1065.08   7/2 therex2+man   73.39 1138.47   7/6 therex2+man   73.39 1211.86   7/9 therex2+man   73.39 1285.25   7/9 therex2+man   73.39 1358.64   7/12 therex1+man   50.55 1409.19   7/16 therex2+man   73.39 1482.58   7/19 therex2+man   73.39 1555.97   7/22 therex2+man   73.39 1629.36   7/26 therex2_man   73.39 1702.75   7/30 therex2+man   73.39 1776.14   8/2 therex2+man   73.39 1849.53   8/6 therex2+man   73.39 1922.92   8/9/21 therex2+man   73.39 1996.32   8/9/21 therex2+man   73.39 2069.71   8/20/21 therex2+man   73.39 2143.10

## 2021-08-23 ENCOUNTER — APPOINTMENT (OUTPATIENT)
Dept: PHYSICAL THERAPY | Facility: CLINIC | Age: 71
End: 2021-08-23
Payer: MEDICARE

## 2022-02-11 NOTE — FLOWSHEET NOTE
[address]  [address] [facility].  Phone: [phone]  Fax: [phone]    PHYSICAL THERAPY DISCHARGE SUMMARY    Date: 2022  Patient Name: Larisa Sylvester        MRN: [id number]     Acct#:   : 1950  (66 y.o.)    Physician: Yadira [facility]: MEDICARE              Eligibility Status:  Eligible     Secondary Insurance (if applicable) [employer]              Eligibility Status: Eligible      DOS: 5.3.21  # of visits allowed/remaining: Based on medical necessity  Source: Website  Spoke To:  [employer]  Reference: [id number]  Medical Diagnosis: S/P ORIF R distal radius fx                    Rehab Codes: S52.501A  Onset Date: 3/3021     Date of Initial Eval: 5/3/21  Date of Final Treatment: 21  Total number of visits: 28    Discharge Status:  [ ] Patient recovered from condition. Treatment Goals were met. [ ] Patient received maximum benefit. No further therapy indicated at this time. [x] Patient demonstrated improvement from condition with      2    of     4     goals met. [x] Patient to continue exercises/home instructions independently. [ ] Therapy interrupted due to:  [ ] Patient has two or more no-shows/cancellations and has been discontinued per our no show/cancellation policy. [ ] Patient has completed their prescribed number of treatment sessions. [ ] Other:      Pain level at Evaluation was    3    /10 and at Discharge was     0   /10. [ ] Patient returned to work. [ ] Patient demonstrated improved level of function. [ ] Patient has returned to previous functional level.   [ ] William Schroeder current status unknown due to no-shows  [x] Other: Pt demonstrating progress with less pain, improved ROM, but cont weakness of wrist.    Recommendations/Comments: Cont home program exercises to address noted deficits     Treatment Included:  [x] Therapeutic Exercise  [x  ] Manual Therapy  [ x ] Hot/Cold Pack [  [place] [place]  [  ] Elec-Stim    [  ] Iontophoresis [  ]Aquatics [  ]  Therapeutic Activity   [  ] Neuro Re-Education  [  ] Gait    [  ] Massage  [  ] Traction    Thank you for the patient referral to Physical Therapy.  Please feel free to contact me with any questions or concerns regarding this patient's care.    ______________________________________  Ирина Blandon, PT   PT ATC    Date: 2/11/2022

## (undated) DEVICE — C-ARM: Brand: UNBRANDED

## (undated) DEVICE — GLOVE ORANGE PI 7 1/2   MSG9075

## (undated) DEVICE — PADDING,UNDERCAST,COTTON, 4"X4YD STERILE: Brand: MEDLINE

## (undated) DEVICE — BIT DRL L80MM DIA2.3MM FLUT DISP

## (undated) DEVICE — APPLICATOR MEDICATED 26 CC SOLUTION HI LT ORNG CHLORAPREP

## (undated) DEVICE — CORD ES L12FT BPLR FRCP

## (undated) DEVICE — MARKER,SKIN,WI/RULER AND LABELS: Brand: MEDLINE

## (undated) DEVICE — DRAPE,U/ SHT,SPLIT,PLAS,STERIL: Brand: MEDLINE

## (undated) DEVICE — INTENDED FOR TISSUE SEPARATION, AND OTHER PROCEDURES THAT REQUIRE A SHARP SURGICAL BLADE TO PUNCTURE OR CUT.: Brand: BARD-PARKER ® CARBON RIB-BACK BLADES

## (undated) DEVICE — SPLINT ORTH W3XL12IN LAYERED FBRGLS FOAM PD BRTH BK MOLD

## (undated) DEVICE — PADDING,UNDERCAST,COTTON, 3X4YD STERILE: Brand: MEDLINE

## (undated) DEVICE — BANDAGE,ELASTIC,ESMARK,STERILE,4"X9',LF: Brand: MEDLINE

## (undated) DEVICE — GOWN,AURORA,NONREINFORCED,LARGE: Brand: MEDLINE

## (undated) DEVICE — GLOVE ORANGE PI 8   MSG9080

## (undated) DEVICE — GLOVE ORANGE PI 7   MSG9070

## (undated) DEVICE — SUTURE VCRL SZ 2-0 L27IN ABSRB UD L22MM X-1 1/2 CIR REV CUT J459H

## (undated) DEVICE — BIT DRL L90MM DIA1.8MM MINI FLUT PROX END DISP

## (undated) DEVICE — SUTURE STRATAFIX SPRL SZ 3-0 L5IN ABSRB UD FS L26MM 3/8 CIR SXMP2B411

## (undated) DEVICE — K WIRE FIX L100MM DIA1.1MM S STL DORS HK PLT
Type: IMPLANTABLE DEVICE | Site: WRIST | Status: NON-FUNCTIONAL
Removed: 2021-03-30

## (undated) DEVICE — ADHESIVE SKIN CLSR 0.7ML TOP DERMBND ADV

## (undated) DEVICE — STAINLESS STEEL K WIRE 62
Type: IMPLANTABLE DEVICE | Site: WRIST | Status: NON-FUNCTIONAL
Removed: 2021-03-30

## (undated) DEVICE — BANDAGE COBAN 4 IN COMPR W4INXL5YD FOAM COHESIVE QUIK STK SELF ADH SFT

## (undated) DEVICE — BNDG,ELSTC,MATRIX,STRL,4"X5YD,LF,HOOK&LP: Brand: MEDLINE

## (undated) DEVICE — ORTHO EXT PK

## (undated) DEVICE — SUTURE VCRL SZ 0 L27IN ABSRB UD L26MM CT-2 1/2 CIR J270H

## (undated) DEVICE — GARMENT,MEDLINE,DVT,INT,CALF,MED, GEN2: Brand: MEDLINE

## (undated) DEVICE — GLOVE SURG SZ 65 THK91MIL LTX FREE SYN POLYISOPRENE